# Patient Record
Sex: MALE | Race: WHITE | Employment: OTHER | URBAN - NONMETROPOLITAN AREA
[De-identification: names, ages, dates, MRNs, and addresses within clinical notes are randomized per-mention and may not be internally consistent; named-entity substitution may affect disease eponyms.]

---

## 2023-08-16 ENCOUNTER — HOSPITAL ENCOUNTER (OUTPATIENT)
Dept: GENERAL RADIOLOGY | Age: 73
Discharge: HOME | End: 2023-08-16
Payer: OTHER GOVERNMENT

## 2023-08-16 ENCOUNTER — OFFICE VISIT (OUTPATIENT)
Dept: NEUROLOGY | Age: 73
End: 2023-08-16
Payer: OTHER GOVERNMENT

## 2023-08-16 VITALS
WEIGHT: 270 LBS | BODY MASS INDEX: 36.57 KG/M2 | DIASTOLIC BLOOD PRESSURE: 82 MMHG | OXYGEN SATURATION: 97 % | HEART RATE: 51 BPM | HEIGHT: 72 IN | SYSTOLIC BLOOD PRESSURE: 159 MMHG

## 2023-08-16 DIAGNOSIS — M25.562 ACUTE PAIN OF LEFT KNEE: Primary | ICD-10-CM

## 2023-08-16 DIAGNOSIS — R29.898 WEAKNESS OF LEFT FOOT: ICD-10-CM

## 2023-08-16 DIAGNOSIS — R20.0 NUMBNESS AND TINGLING OF BOTH LEGS: ICD-10-CM

## 2023-08-16 DIAGNOSIS — M25.562 ACUTE PAIN OF LEFT KNEE: ICD-10-CM

## 2023-08-16 DIAGNOSIS — R20.2 NUMBNESS AND TINGLING OF BOTH LEGS: Primary | ICD-10-CM

## 2023-08-16 DIAGNOSIS — R20.0 NUMBNESS AND TINGLING OF BOTH LEGS: Primary | ICD-10-CM

## 2023-08-16 DIAGNOSIS — M54.50 CHRONIC BILATERAL LOW BACK PAIN WITHOUT SCIATICA: ICD-10-CM

## 2023-08-16 DIAGNOSIS — R20.2 NUMBNESS AND TINGLING OF BOTH LEGS: ICD-10-CM

## 2023-08-16 DIAGNOSIS — G89.29 CHRONIC BILATERAL LOW BACK PAIN WITHOUT SCIATICA: ICD-10-CM

## 2023-08-16 LAB
ALBUMIN SERPL-MCNC: 4.8 G/DL (ref 3.5–5.2)
ALP SERPL-CCNC: 90 U/L (ref 40–130)
ALT SERPL-CCNC: 31 U/L (ref 5–41)
ANION GAP SERPL CALCULATED.3IONS-SCNC: 12 MMOL/L (ref 7–19)
AST SERPL-CCNC: 24 U/L (ref 5–40)
BASOPHILS # BLD: 0.1 K/UL (ref 0–0.2)
BASOPHILS NFR BLD: 0.8 % (ref 0–1)
BILIRUB SERPL-MCNC: 0.8 MG/DL (ref 0.2–1.2)
BUN SERPL-MCNC: 9 MG/DL (ref 8–23)
CALCIUM SERPL-MCNC: 10.2 MG/DL (ref 8.8–10.2)
CHLORIDE SERPL-SCNC: 101 MMOL/L (ref 98–111)
CO2 SERPL-SCNC: 29 MMOL/L (ref 22–29)
CREAT SERPL-MCNC: 0.7 MG/DL (ref 0.5–1.2)
CRP SERPL HS-MCNC: <0.3 MG/DL (ref 0–0.5)
EOSINOPHIL # BLD: 0.2 K/UL (ref 0–0.6)
EOSINOPHIL NFR BLD: 2.9 % (ref 0–5)
ERYTHROCYTE [DISTWIDTH] IN BLOOD BY AUTOMATED COUNT: 13.9 % (ref 11.5–14.5)
ERYTHROCYTE [SEDIMENTATION RATE] IN BLOOD BY WESTERGREN METHOD: 2 MM/HR (ref 0–15)
GLUCOSE SERPL-MCNC: 122 MG/DL (ref 74–109)
HBA1C MFR BLD: 7.4 % (ref 4–6)
HCT VFR BLD AUTO: 47.2 % (ref 42–52)
HGB BLD-MCNC: 15.4 G/DL (ref 14–18)
HIV-1 P24 AG: NORMAL
HIV1+2 AB SERPLBLD QL IA.RAPID: NORMAL
IMM GRANULOCYTES # BLD: 0 K/UL
LYMPHOCYTES # BLD: 1.5 K/UL (ref 1.1–4.5)
LYMPHOCYTES NFR BLD: 23.1 % (ref 20–40)
MCH RBC QN AUTO: 29.5 PG (ref 27–31)
MCHC RBC AUTO-ENTMCNC: 32.6 G/DL (ref 33–37)
MCV RBC AUTO: 90.4 FL (ref 80–94)
MONOCYTES # BLD: 0.7 K/UL (ref 0–0.9)
MONOCYTES NFR BLD: 10.8 % (ref 0–10)
NEUTROPHILS # BLD: 3.9 K/UL (ref 1.5–7.5)
NEUTS SEG NFR BLD: 61.9 % (ref 50–65)
PLATELET # BLD AUTO: 167 K/UL (ref 130–400)
PMV BLD AUTO: 11.3 FL (ref 9.4–12.4)
POTASSIUM SERPL-SCNC: 4.3 MMOL/L (ref 3.5–5)
PROT SERPL-MCNC: 7.6 G/DL (ref 6.6–8.7)
RBC # BLD AUTO: 5.22 M/UL (ref 4.7–6.1)
RHEUMATOID FACT SER NEPH-ACNC: <10 IU/ML
SODIUM SERPL-SCNC: 142 MMOL/L (ref 136–145)
TSH SERPL DL<=0.005 MIU/L-ACNC: 0.99 UIU/ML (ref 0.35–5.5)
VIT B12 SERPL-MCNC: 994 PG/ML (ref 211–946)
WBC # BLD AUTO: 6.3 K/UL (ref 4.8–10.8)

## 2023-08-16 PROCEDURE — 99204 OFFICE O/P NEW MOD 45 MIN: CPT | Performed by: NURSE PRACTITIONER

## 2023-08-16 PROCEDURE — 1123F ACP DISCUSS/DSCN MKR DOCD: CPT | Performed by: NURSE PRACTITIONER

## 2023-08-16 PROCEDURE — 73560 X-RAY EXAM OF KNEE 1 OR 2: CPT

## 2023-08-16 RX ORDER — AMPICILLIN TRIHYDRATE 250 MG
CAPSULE ORAL
COMMUNITY

## 2023-08-16 RX ORDER — ALOGLIPTIN 25 MG/1
25 TABLET, FILM COATED ORAL DAILY
COMMUNITY

## 2023-08-16 RX ORDER — LISINOPRIL 40 MG/1
40 TABLET ORAL 2 TIMES DAILY
COMMUNITY

## 2023-08-16 RX ORDER — LANOLIN ALCOHOL/MO/W.PET/CERES
1000 CREAM (GRAM) TOPICAL DAILY
COMMUNITY

## 2023-08-16 RX ORDER — DULOXETIN HYDROCHLORIDE 20 MG/1
20 CAPSULE, DELAYED RELEASE ORAL DAILY
Qty: 30 CAPSULE | Refills: 3 | Status: SHIPPED | OUTPATIENT
Start: 2023-08-16

## 2023-08-16 RX ORDER — ASPIRIN 81 MG/1
81 TABLET ORAL DAILY
COMMUNITY

## 2023-08-16 RX ORDER — AMLODIPINE BESYLATE 2.5 MG/1
TABLET ORAL
COMMUNITY
Start: 2023-06-09

## 2023-08-16 RX ORDER — ATORVASTATIN CALCIUM 40 MG/1
40 TABLET, FILM COATED ORAL DAILY
COMMUNITY

## 2023-08-16 RX ORDER — PYRIDOXINE HCL (VITAMIN B6) 100 MG
50 TABLET ORAL DAILY
COMMUNITY

## 2023-08-16 RX ORDER — SILDENAFIL 100 MG/1
100 TABLET, FILM COATED ORAL PRN
COMMUNITY

## 2023-08-16 RX ORDER — HYDROCHLOROTHIAZIDE 50 MG/1
25 TABLET ORAL DAILY
COMMUNITY

## 2023-08-16 RX ORDER — PREGABALIN 150 MG/1
150 CAPSULE ORAL 2 TIMES DAILY
COMMUNITY

## 2023-08-16 NOTE — PROGRESS NOTES
[facility] Neurology Office Note      Patient:   Elisa Coffey  MR#:    [id number]  Account Number:                         YOB: 1950  Date of Evaluation:  8/16/2023  Time of Note:                          9:09 AM  Primary/Referring Physician:  PROVIDER UNKNOWN   Consulting Physician:  Willow Alas DNP, EYAL    NEW PATIENT CONSULTATION    Chief Complaint   Patient presents with    New Patient    Numbness     C/o numbness and tingling in all extremities. Was dx with neuropathy in early 2000's and is getting worse. States in June he has been dragging his left foot    Fall     Last fall was 1 week ago       [address] is a 68y.o. year old male here for evaluation of numbness/tingling, BLE weakness, falls. Diagnosed with neuropathy in 2008 at the [facility]. Has noted progression of symptoms over the last several years now. He notes BLE numbness and tingling. Has a cold sensation in both feet. He has noted heaviness in both legs, feels like he is dragging his left foot. He notes lower back pain with intermittent radiation of pain into the left leg. He did have several falls over the summer, injured his left knee in one fall. He is on Lyrica 150mg BID. Tried and failed Gabapentin. Has underlying vascular disease in BLE as well. He is a diabetic, last A1C was 7.6. History of vitamin B12 deficiency, on oral replacement. History of skin cancers but no chemotherapy. He was in the [employer] and exposed to agent orange. Past Medical History:   Diagnosis Date    Bilateral tinnitus     Chronic fatigue     Diabetes mellitus ([address])     Hypertension     Joint pain     Neuropathy     CODY (obstructive sleep apnea)        Past Surgical History:   Procedure Laterality Date    CYST REMOVAL         History reviewed. No pertinent family history.     Social History     Socioeconomic History    Marital status:      Spouse name: Not on file    Number of

## 2023-08-17 LAB — NUCLEAR IGG SER QL IA: NORMAL

## 2023-08-18 LAB
ALBUMIN SERPL-MCNC: 4.51 G/DL (ref 3.75–5.01)
ALPHA1 GLOB SERPL ELPH-MCNC: 0.32 G/DL (ref 0.19–0.46)
ALPHA2 GLOB SERPL ELPH-MCNC: 0.71 G/DL (ref 0.48–1.05)
B-GLOBULIN SERPL ELPH-MCNC: 1.05 G/DL (ref 0.48–1.1)
GAMMA GLOB SERPL ELPH-MCNC: 0.92 G/DL (ref 0.62–1.51)
INTERPRETATION SERPL IFE-IMP: NORMAL
PROT SERPL-MCNC: 7.5 G/DL (ref 6.3–8.2)
PROTEIN ELECTROPHORESIS, SERUM: NORMAL

## 2023-08-31 ENCOUNTER — HOSPITAL ENCOUNTER (OUTPATIENT)
Dept: NEUROLOGY | Age: 73
Discharge: HOME | End: 2023-08-31
Payer: OTHER GOVERNMENT

## 2023-08-31 DIAGNOSIS — R20.0 NUMBNESS AND TINGLING OF BOTH LEGS: ICD-10-CM

## 2023-08-31 DIAGNOSIS — M54.50 CHRONIC BILATERAL LOW BACK PAIN WITHOUT SCIATICA: ICD-10-CM

## 2023-08-31 DIAGNOSIS — R29.898 WEAKNESS OF LEFT FOOT: ICD-10-CM

## 2023-08-31 DIAGNOSIS — R20.2 NUMBNESS AND TINGLING OF BOTH LEGS: ICD-10-CM

## 2023-08-31 DIAGNOSIS — G89.29 CHRONIC BILATERAL LOW BACK PAIN WITHOUT SCIATICA: ICD-10-CM

## 2023-08-31 PROCEDURE — 95886 MUSC TEST DONE W/N TEST COMP: CPT | Performed by: PSYCHIATRY & NEUROLOGY

## 2023-08-31 PROCEDURE — 95909 NRV CNDJ TST 5-6 STUDIES: CPT

## 2023-08-31 PROCEDURE — 95886 MUSC TEST DONE W/N TEST COMP: CPT

## 2023-08-31 PROCEDURE — 95909 NRV CNDJ TST 5-6 STUDIES: CPT | Performed by: PSYCHIATRY & NEUROLOGY

## 2023-10-09 ENCOUNTER — HOSPITAL ENCOUNTER (OUTPATIENT)
Dept: MRI IMAGING | Age: 73
Discharge: HOME | End: 2023-10-09
Payer: OTHER GOVERNMENT

## 2023-10-09 ENCOUNTER — TELEPHONE (OUTPATIENT)
Dept: NEUROSURGERY | Age: 73
End: 2023-10-09

## 2023-10-09 ENCOUNTER — CLINICAL DOCUMENTATION (OUTPATIENT)
Dept: NEUROLOGY | Age: 73
End: 2023-10-09

## 2023-10-09 DIAGNOSIS — M54.50 CHRONIC BILATERAL LOW BACK PAIN WITHOUT SCIATICA: ICD-10-CM

## 2023-10-09 DIAGNOSIS — N28.89 LEFT RENAL MASS: Primary | ICD-10-CM

## 2023-10-09 DIAGNOSIS — G89.29 CHRONIC BILATERAL LOW BACK PAIN WITHOUT SCIATICA: ICD-10-CM

## 2023-10-09 DIAGNOSIS — R20.2 NUMBNESS AND TINGLING OF BOTH LEGS: ICD-10-CM

## 2023-10-09 DIAGNOSIS — R29.898 WEAKNESS OF LEFT FOOT: ICD-10-CM

## 2023-10-09 DIAGNOSIS — R20.0 NUMBNESS AND TINGLING OF BOTH LEGS: ICD-10-CM

## 2023-10-09 LAB
CREAT SERPL-MCNC: 0.7 MG/DL (ref 0.3–1.3)
PERFORMED ON: NORMAL
POC SAMPLE TYPE: NORMAL

## 2023-10-09 PROCEDURE — 72158 MRI LUMBAR SPINE W/O & W/DYE: CPT

## 2023-10-09 PROCEDURE — A9577 INJ MULTIHANCE: HCPCS | Performed by: NURSE PRACTITIONER

## 2023-10-09 PROCEDURE — 82565 ASSAY OF CREATININE: CPT

## 2023-10-09 PROCEDURE — 6360000004 HC RX CONTRAST MEDICATION: Performed by: NURSE PRACTITIONER

## 2023-10-09 RX ADMIN — GADOBENATE DIMEGLUMINE 20 ML: 529 INJECTION, SOLUTION INTRAVENOUS at 08:14

## 2023-10-09 NOTE — PROGRESS NOTES
MRI lumbar spine reviewed. There is DDD noted throughout with several levels of moderate to severe NF narrowing. There is also a 7.9cm mass in the left kidney which is suspicious for renal cell carcinoma. I called and discussed findings with the patient, answered questions. He verbalized understanding. Will place referral to oncology for additional work up/opinion. Will also place referral to neurosurgery for left foot weakness and MRI findings.

## 2023-10-09 NOTE — TELEPHONE ENCOUNTER
[facility] New Patient Questionnaire    Diagnosis/Reason for Referral?    Weakness of left foot  M54.50, G89.29 (ICD-10-CM) - Chronic bilateral low back pain without sciatica    2. Who is completing questionnaire? Patient  Caregiver Family      3. Has the patient had any previous spinal/brain surgeries? NO      A. If yes, what is the name of the facility in which the surgery was performed? B. Procedure/Surgery performed? C. Who was the surgeon? D. When was the surgery? MM/YY       E. Did the patient improve after the surgery? 4. Is this a second opinion? If yes, Dr. Joe Velasquez would like to review patient first before making the appointment. 5. Have MRI Images been obtain within the last year? Yes  No      XR  CT     If yes, where was the imaging performed? [facility]   If yes, what part of the body? Lumbar  Cervical  Thoracic  Brain     If yes, when was it obtained? 10/9/23    Note: if the scan was performed at a facility other than [facility], the disc will need to be brought to the appointment or we need to reach out to obtain the disc. A. Was the patient instructed to provide the disc? Yes   No      8. Has the patient had a NCV/EMG within the last year? Yes  No     If yes, where was it performed and date? 8/31/23 Location:[facility]      9. Has the patient been to Physical Therapy? Yes  No     If yes, what location, how long attended, and last visit? Location:        Therapy Lasted:    Date of Last Visit:      10. Has the patient been to Pain Management? Yes  No     If yes, what location and last visit     Location:   Last Visit:   Is it helping?

## 2023-10-10 ENCOUNTER — OFFICE VISIT (OUTPATIENT)
Dept: NEUROSURGERY | Age: 73
End: 2023-10-10
Payer: OTHER GOVERNMENT

## 2023-10-10 VITALS
DIASTOLIC BLOOD PRESSURE: 70 MMHG | RESPIRATION RATE: 18 BRPM | WEIGHT: 265 LBS | HEART RATE: 89 BPM | OXYGEN SATURATION: 94 % | HEIGHT: 70 IN | SYSTOLIC BLOOD PRESSURE: 151 MMHG | BODY MASS INDEX: 37.94 KG/M2

## 2023-10-10 DIAGNOSIS — M48.061 LUMBAR FORAMINAL STENOSIS: ICD-10-CM

## 2023-10-10 DIAGNOSIS — M51.16 LUMBAR DISC HERNIATION WITH RADICULOPATHY: Primary | ICD-10-CM

## 2023-10-10 DIAGNOSIS — M54.42 CHRONIC MIDLINE LOW BACK PAIN WITH LEFT-SIDED SCIATICA: ICD-10-CM

## 2023-10-10 DIAGNOSIS — R29.898 WEAKNESS OF LEFT FOOT: ICD-10-CM

## 2023-10-10 DIAGNOSIS — G89.29 CHRONIC MIDLINE LOW BACK PAIN WITH LEFT-SIDED SCIATICA: ICD-10-CM

## 2023-10-10 DIAGNOSIS — M51.36 DDD (DEGENERATIVE DISC DISEASE), LUMBAR: ICD-10-CM

## 2023-10-10 PROCEDURE — 99204 OFFICE O/P NEW MOD 45 MIN: CPT | Performed by: NEUROLOGICAL SURGERY

## 2023-10-10 PROCEDURE — 1123F ACP DISCUSS/DSCN MKR DOCD: CPT | Performed by: NEUROLOGICAL SURGERY

## 2023-10-10 ASSESSMENT — ENCOUNTER SYMPTOMS
BACK PAIN: 1
EYES NEGATIVE: 1
GASTROINTESTINAL NEGATIVE: 1
RESPIRATORY NEGATIVE: 1

## 2023-10-10 NOTE — PROGRESS NOTES
[facility] Neurosurgery  Office Visit      Chief Complaint   Patient presents with    New Patient     To establish care. Leg Pain     Patient states he has numbness and tingling in BLE with the left worse than the right side. He states he also has a mass on his kidneys that he was not sure is it was causing these symptoms. Patient states his pain is about a 6/10 today and that is in his bilateral feet, legs, and back. Fall     Patient had a fall around 6/3/2023 and that is when he think he noticed the weakness in his left foot. Back Pain     Patient states that he has had LBP for years and his symptoms are worsening suddenly. HISTORY OF PRESENT ILLNESS:    Bernardo Miramontes is a 68 y.o. male who presents with numbness of the BLE and foot weakness. He states he had a fall back in June of 2023 and since then he has been dragging his left leg. The pain does radiate into the LEFT lateral thigh. No pain travels past the knee. His pain is mostly located in the LLE. The patient complains of numbness of the bilateral feet. When ambulating, he has some LLE weakness, gait instability, and becomes dizzy. He has known neuropathy from being exposed to agent orange while in the [employer]. The patient has underwent a non-operative treatment course that has included:  Lyrica      Of note he does not use tobacco and does take blood thinning medications (ASA).                  Past Medical History:   Diagnosis Date    Bilateral tinnitus     Chronic fatigue     Diabetes mellitus (HCC)     Hypertension     Joint pain     Neuropathy     CODY (obstructive sleep apnea)        Past Surgical History:   Procedure Laterality Date    CYST REMOVAL         Current Outpatient Medications   Medication Sig Dispense Refill    amLODIPine (NORVASC) 2.5 MG tablet TAKE 1 TABLET (2.5 MG) BY ORAL ROUTE ONCE DAILY ADDED TO 5MG PILL = 7.5MG DAILY      lisinopril (PRINIVIL;ZESTRIL) 40 MG tablet Take 1 tablet by mouth in the

## 2023-10-10 NOTE — PROGRESS NOTES
Review of Systems   Constitutional: Negative. HENT: Negative. Eyes: Negative. Respiratory: Negative. Cardiovascular: Negative. Gastrointestinal: Negative. Genitourinary: Negative. Musculoskeletal:  Positive for back pain, falls and myalgias. Skin: Negative. Neurological:  Positive for tingling and weakness. Endo/Heme/Allergies: Negative. Psychiatric/Behavioral: Negative.

## 2023-10-11 NOTE — PROGRESS NOTES
MEDICAL ONCOLOGY CONSULTATION    Pt Name: Luis Mccann  MRN: [id number]  YOB: 1950  Date of evaluation: 10/16/2023    REASON FOR CONSULTATION:  Left kidney mass  REQUESTING PHYSICIAN: EYAL Mendez/[facility]    History Obtained From:  patient and old medical records    HISTORY OF PRESENT ILLNESS:    Diagnosis  Left  kidney mass, likely RCC Oct 2023  [id number]    Treatment Summary  To be determined    Cancer History  Isabel Aguilar was first seen by me on 10/16/2003. He was referred by neurology for an incidental finding of a large left kidney mass. Patient had complaints of back pain. MRI lumbar spine shows severe multilevel degenerative disease and also incidental finding of a large left kidney mass. Patient denies any hematuria. He was recently in the ER department with hypertension. Denies any abdominal pain. Denies any flank pain. 10/9/23 MRI lumbar spine ([facility]): Multilevel degenerative spondylosis. Findings are worse at L4/L5 where there is a central disc protrusion, severe left subarticular recess stenosis, and severe bilateral neural foraminal stenosis. Large 7.9cm mass in the superior left kidney suspicious for renal cell carcinoma. Unexpected finding. 10/16/2023-proceed with CT C/A/P for completion of staging and follow-up with PCP for a referral to urology. Past Medical History:    Past Medical History:   Diagnosis Date    Basal cell carcinoma     Bilateral tinnitus     Chronic fatigue     Diabetes mellitus ([address])     Hypertension     Joint pain     Neuropathy     CODY (obstructive sleep apnea)        Past Surgical History:    Past Surgical History:   Procedure Laterality Date    CYST REMOVAL      HERNIA REPAIR  1997       Social History:    Marital status:   Smoking status:Formerly; 1 1/2 pack daily; [id number]  ETOH status:Currently; 12 beers weekly  Resides: [place]    Family History:   History reviewed. No pertinent family history.     Current

## 2023-10-13 ENCOUNTER — TELEPHONE (OUTPATIENT)
Dept: HEMATOLOGY | Age: 73
End: 2023-10-13

## 2023-10-13 DIAGNOSIS — N28.89 LEFT RENAL MASS: Primary | ICD-10-CM

## 2023-10-13 NOTE — TELEPHONE ENCOUNTER
I called patient to remind them of their appointment on 10/16/2023. Patient voiced their understanding of the appointment and confirmed they would be coming. Patient was given date & time of appt.

## 2023-10-16 ENCOUNTER — HOSPITAL ENCOUNTER (OUTPATIENT)
Dept: INFUSION THERAPY | Age: 73
Discharge: HOME | End: 2023-10-16
Payer: MEDICARE

## 2023-10-16 ENCOUNTER — OFFICE VISIT (OUTPATIENT)
Dept: HEMATOLOGY | Age: 73
End: 2023-10-16
Payer: MEDICARE

## 2023-10-16 VITALS
TEMPERATURE: 98 F | HEART RATE: 74 BPM | WEIGHT: 272.4 LBS | BODY MASS INDEX: 36.9 KG/M2 | HEIGHT: 72 IN | SYSTOLIC BLOOD PRESSURE: 136 MMHG | DIASTOLIC BLOOD PRESSURE: 80 MMHG | OXYGEN SATURATION: 96 %

## 2023-10-16 DIAGNOSIS — N28.89 LEFT RENAL MASS: ICD-10-CM

## 2023-10-16 DIAGNOSIS — N28.89 LEFT KIDNEY MASS: Primary | ICD-10-CM

## 2023-10-16 DIAGNOSIS — Z71.89 CARE PLAN DISCUSSED WITH PATIENT: ICD-10-CM

## 2023-10-16 DIAGNOSIS — M47.816 OSTEOARTHRITIS OF LUMBAR SPINE, UNSPECIFIED SPINAL OSTEOARTHRITIS COMPLICATION STATUS: ICD-10-CM

## 2023-10-16 LAB
BASOPHILS # BLD: 0.05 K/UL (ref 0.01–0.08)
BASOPHILS NFR BLD: 0.5 % (ref 0.1–1.2)
EOSINOPHIL # BLD: 0.16 K/UL (ref 0.04–0.54)
EOSINOPHIL NFR BLD: 1.7 % (ref 0.7–7)
ERYTHROCYTE [DISTWIDTH] IN BLOOD BY AUTOMATED COUNT: 13.8 % (ref 11.6–14.4)
HCT VFR BLD AUTO: 41.3 % (ref 40.1–51)
HGB BLD-MCNC: 14.4 G/DL (ref 13.7–17.5)
LYMPHOCYTES # BLD: 1.35 K/UL (ref 1.18–3.74)
LYMPHOCYTES NFR BLD: 14.4 % (ref 19.3–53.1)
MCH RBC QN AUTO: 29.4 PG (ref 25.7–32.2)
MCHC RBC AUTO-ENTMCNC: 34.9 G/DL (ref 32.3–36.5)
MCV RBC AUTO: 84.5 FL (ref 79–92.2)
MONOCYTES # BLD: 0.72 K/UL (ref 0.24–0.82)
MONOCYTES NFR BLD: 7.7 % (ref 4.7–12.5)
NEUTROPHILS # BLD: 7.05 K/UL (ref 1.56–6.13)
NEUTS SEG NFR BLD: 75.3 % (ref 34–71.1)
PLATELET # BLD AUTO: 195 K/UL (ref 163–337)
PMV BLD AUTO: 11.1 FL (ref 7.4–10.4)
RBC # BLD AUTO: 4.89 M/UL (ref 4.63–6.08)
WBC # BLD AUTO: 9.37 K/UL (ref 4.23–9.07)

## 2023-10-16 PROCEDURE — 1123F ACP DISCUSS/DSCN MKR DOCD: CPT | Performed by: INTERNAL MEDICINE

## 2023-10-16 PROCEDURE — 36415 COLL VENOUS BLD VENIPUNCTURE: CPT

## 2023-10-16 PROCEDURE — 99204 OFFICE O/P NEW MOD 45 MIN: CPT | Performed by: INTERNAL MEDICINE

## 2023-10-16 PROCEDURE — 85025 COMPLETE CBC W/AUTO DIFF WBC: CPT

## 2023-10-16 PROCEDURE — 99212 OFFICE O/P EST SF 10 MIN: CPT

## 2023-10-16 ASSESSMENT — PROMIS GLOBAL HEALTH SCALE
IN THE PAST 7 DAYS, HOW WOULD YOU RATE YOUR PAIN ON AVERAGE [ON A SCALE FROM 0 (NO PAIN) TO 10 (WORST IMAGINABLE PAIN)]?: 6
TO WHAT EXTENT ARE YOU ABLE TO CARRY OUT YOUR EVERYDAY PHYSICAL ACTIVITIES SUCH AS WALKING, CLIMBING STAIRS, CARRYING GROCERIES, OR MOVING A CHAIR [ON A SCALE OF 1 (NOT AT ALL) TO 5 (COMPLETELY)]?: 3
IN GENERAL, HOW WOULD YOU RATE YOUR MENTAL HEALTH, INCLUDING YOUR MOOD AND YOUR ABILITY TO THINK [ON A SCALE OF 1 (POOR) TO 5 (EXCELLENT)]?: 3
IN THE PAST 7 DAYS, HOW OFTEN HAVE YOU BEEN BOTHERED BY EMOTIONAL PROBLEMS, SUCH AS FEELING ANXIOUS, DEPRESSED, OR IRRITABLE [ON A SCALE FROM 1 (NEVER) TO 5 (ALWAYS)]?: 3
SUM OF RESPONSES TO QUESTIONS 3, 6, 7, & 8: 14
SUM OF RESPONSES TO QUESTIONS 2, 4, 5, & 10: 11
IN GENERAL, HOW WOULD YOU RATE YOUR PHYSICAL HEALTH [ON A SCALE OF 1 (POOR) TO 5 (EXCELLENT)]?: 2
IN GENERAL, WOULD YOU SAY YOUR HEALTH IS...[ON A SCALE OF 1 (POOR) TO 5 (EXCELLENT)]: 2
IN THE PAST 7 DAYS, HOW WOULD YOU RATE YOUR FATIGUE ON AVERAGE [ON A SCALE FROM 1 (NONE) TO 5 (VERY SEVERE)]?: 3
IN GENERAL, PLEASE RATE HOW WELL YOU CARRY OUT YOUR USUAL SOCIAL ACTIVITIES (INCLUDES ACTIVITIES AT HOME, AT WORK, AND IN YOUR COMMUNITY, AND RESPONSIBILITIES AS A PARENT, CHILD, SPOUSE, EMPLOYEE, FRIEND, ETC) [ON A SCALE OF 1 (POOR) TO 5 (EXCELLENT)]?: 2
IN GENERAL, WOULD YOU SAY YOUR QUALITY OF LIFE IS...[ON A SCALE OF 1 (POOR) TO 5 (EXCELLENT)]: 3
IN GENERAL, HOW WOULD YOU RATE YOUR SATISFACTION WITH YOUR SOCIAL ACTIVITIES AND RELATIONSHIPS [ON A SCALE OF 1 (POOR) TO 5 (EXCELLENT)]?: 2

## 2023-10-30 ENCOUNTER — TELEPHONE (OUTPATIENT)
Dept: HEMATOLOGY | Age: 73
End: 2023-10-30

## 2023-10-30 NOTE — TELEPHONE ENCOUNTER
Call placed to patient this date for PROMIS screening follow-up. Patient did answer call. States he feels well supported at home right now. Denies need for assistance or support. Denies the need for community resources- denies transportation concerns or food insecurity. [facility]. Lives with spouse. Denies the need for further assist or support. Encouraged patient to reach out to office if his needs were to change- he is agreeable.      Joe Cruz MSW Intern for [address] [address] Medical Oncology and Hematology

## 2023-12-28 ENCOUNTER — TELEPHONE (OUTPATIENT)
Dept: NEUROLOGY | Age: 73
End: 2023-12-28

## 2023-12-28 NOTE — TELEPHONE ENCOUNTER
Shavon Plater wife Jorge Mixon called to schedule a  appt for back pain, and also dragging of left foot  . She states patient says left leg feels heavy. Please be advised that the best time to call him to accommodate their needs is  as soon as possible . Thank you.

## 2024-01-25 ENCOUNTER — OFFICE VISIT (OUTPATIENT)
Dept: NEUROSURGERY | Age: 74
End: 2024-01-25

## 2024-01-25 VITALS
RESPIRATION RATE: 18 BRPM | HEART RATE: 87 BPM | WEIGHT: 272 LBS | BODY MASS INDEX: 36.84 KG/M2 | SYSTOLIC BLOOD PRESSURE: 115 MMHG | HEIGHT: 72 IN | DIASTOLIC BLOOD PRESSURE: 65 MMHG

## 2024-01-25 DIAGNOSIS — M51.16 LUMBAR DISC HERNIATION WITH RADICULOPATHY: Primary | ICD-10-CM

## 2024-01-25 DIAGNOSIS — M54.42 CHRONIC MIDLINE LOW BACK PAIN WITH LEFT-SIDED SCIATICA: ICD-10-CM

## 2024-01-25 DIAGNOSIS — M51.36 DDD (DEGENERATIVE DISC DISEASE), LUMBAR: ICD-10-CM

## 2024-01-25 DIAGNOSIS — R20.0 NUMBNESS AND TINGLING OF BOTH LEGS: ICD-10-CM

## 2024-01-25 DIAGNOSIS — M48.061 LUMBAR FORAMINAL STENOSIS: ICD-10-CM

## 2024-01-25 DIAGNOSIS — G89.29 CHRONIC MIDLINE LOW BACK PAIN WITH LEFT-SIDED SCIATICA: ICD-10-CM

## 2024-01-25 DIAGNOSIS — R20.2 NUMBNESS AND TINGLING OF BOTH LEGS: ICD-10-CM

## 2024-01-25 DIAGNOSIS — R29.898 WEAKNESS OF LEFT FOOT: ICD-10-CM

## 2024-01-25 ASSESSMENT — ENCOUNTER SYMPTOMS
EYES NEGATIVE: 1
RESPIRATORY NEGATIVE: 1
GASTROINTESTINAL NEGATIVE: 1
BACK PAIN: 1

## 2024-01-25 NOTE — PROGRESS NOTES
[facility]  Office Visit      Chief Complaint   Patient presents with    Follow-up     Patient presents for follow up after on back pain after physical therapy.     Back Pain     Patient states he is still having back pain.  He is still going to physical therapy.  He states he is having numbness, tingling and weakness in his BUE.  He is having a lot og trouble with the weakness in his LLE.        HISTORY OF PRESENT ILLNESS:    Yohannes Dodge is a 73 y.o. male with a hx of DM who we initially evaluated on 10/10/2023 for numbness of the BLE and foot weakness.  He had a fall back in June of 2023 and since then he had been dragging his left leg.  The pain radiated into the LEFT lateral thigh. His pain was mostly located in the LLE.  Complained of numbness of the bilateral feet.  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      MRI lumbar spine had revealed a disc herniation that was most likely resulting in his pain.  A large mass on the kidney was found on the MRI in which he was referred to Dr. Riggins.  He was evaluated by him on 10/16/2023 who suspected RCC, however, was to have an evaluation [facility].  [facility] [place] [facility].  He is not on any chemo or radiation.  Stating \"they got it all.\" He has recovered well.  Had home health therapy for surgery recovery and for the left shoulder and has improved.      Today he returns to clinic to discuss his back and LLE pain.  Continues to have pain into the LEFT.  Has associate numbness. He is limping and dragging the LLE that is continuing to worsen.  He states they have also found two DVT in the LLE and is now on apixaban.      The patient has underwent a non-operative treatment course that has included:  Lyrica  Physical Therapy ([facility])      Of note he does not use tobacco and

## 2024-01-25 NOTE — PROGRESS NOTES
Review of Systems   Constitutional: Negative.    HENT: Negative.     Eyes: Negative.    Respiratory: Negative.     Cardiovascular: Negative.    Gastrointestinal: Negative.    Genitourinary: Negative.    Musculoskeletal:  Positive for back pain, joint pain and myalgias.   Skin: Negative.    Neurological:  Positive for tingling and weakness.   Endo/Heme/Allergies: Negative.    Psychiatric/Behavioral: Negative.

## 2024-02-13 ENCOUNTER — TELEPHONE (OUTPATIENT)
Dept: HEMATOLOGY | Age: 74
End: 2024-02-13

## 2024-02-13 NOTE — TELEPHONE ENCOUNTER
Patient called and stated his Dr in [place] removed kidney and no longer has cancer and will not need tx.

## 2024-03-12 ENCOUNTER — OFFICE VISIT (OUTPATIENT)
Dept: NEUROSURGERY | Age: 74
End: 2024-03-12
Payer: OTHER GOVERNMENT

## 2024-03-12 VITALS
SYSTOLIC BLOOD PRESSURE: 129 MMHG | DIASTOLIC BLOOD PRESSURE: 66 MMHG | HEART RATE: 74 BPM | HEIGHT: 72 IN | WEIGHT: 272 LBS | BODY MASS INDEX: 36.84 KG/M2

## 2024-03-12 DIAGNOSIS — M54.42 CHRONIC MIDLINE LOW BACK PAIN WITH LEFT-SIDED SCIATICA: ICD-10-CM

## 2024-03-12 DIAGNOSIS — R20.0 NUMBNESS AND TINGLING OF BOTH LEGS: ICD-10-CM

## 2024-03-12 DIAGNOSIS — R20.2 NUMBNESS AND TINGLING OF BOTH LEGS: ICD-10-CM

## 2024-03-12 DIAGNOSIS — G89.29 CHRONIC MIDLINE LOW BACK PAIN WITH LEFT-SIDED SCIATICA: ICD-10-CM

## 2024-03-12 DIAGNOSIS — M51.36 DDD (DEGENERATIVE DISC DISEASE), LUMBAR: ICD-10-CM

## 2024-03-12 DIAGNOSIS — M51.16 LUMBAR DISC HERNIATION WITH RADICULOPATHY: Primary | ICD-10-CM

## 2024-03-12 DIAGNOSIS — R29.898 WEAKNESS OF LEFT FOOT: ICD-10-CM

## 2024-03-12 DIAGNOSIS — M48.061 LUMBAR FORAMINAL STENOSIS: ICD-10-CM

## 2024-03-12 PROCEDURE — 1123F ACP DISCUSS/DSCN MKR DOCD: CPT | Performed by: NEUROLOGICAL SURGERY

## 2024-03-12 PROCEDURE — 99213 OFFICE O/P EST LOW 20 MIN: CPT | Performed by: NEUROLOGICAL SURGERY

## 2024-03-12 ASSESSMENT — ENCOUNTER SYMPTOMS
EYES NEGATIVE: 1
RESPIRATORY NEGATIVE: 1
GASTROINTESTINAL NEGATIVE: 1

## 2024-03-12 NOTE — PROGRESS NOTES
Review of Systems   Constitutional: Negative.    HENT: Negative.     Eyes: Negative.    Respiratory: Negative.     Cardiovascular: Negative.    Gastrointestinal: Negative.    Genitourinary: Negative.    Musculoskeletal:  Positive for joint pain and myalgias.   Skin: Negative.    Neurological:  Positive for tingling and weakness.

## 2024-03-12 NOTE — PROGRESS NOTES
[facility]  Office Visit      Chief Complaint   Patient presents with    Follow-up     Patient presents for follow up after PT.  Patient states it was going well and he was on a cane then he started declining and had to get back on his walker.  He states \"he has not made much progress with PT.\"     Numbness     Patient states he is have numbness weakness and tingling in his LLE.         HISTORY OF PRESENT ILLNESS:    Yohannes Dodge is a 74 y.o. male with a hx of DM who we initially evaluated on 10/10/2023 for numbness of the BLE and foot weakness.  He had a fall back in June of 2023 and since then he had been dragging his left leg.  The pain radiated into the LEFT lateral thigh. His pain was mostly located in the LLE.  Complained of numbness of the bilateral feet.  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      MRI lumbar spine had revealed a disc herniation that was most likely resulting in his pain.  A large mass on the kidney was found on the MRI in which he was referred to Dr. Riggins.  He was evaluated by him on 10/16/2023 who suspected RCC, however, was to have an evaluation with [facility].  He states [facility] [place] [facility].  He is not on any chemo or radiation.  Stating \"they got it all.\" He has recovered well.  Had home health therapy for surgery recovery and for the left shoulder and has improved.      He continues to have back and left lower extremity pain and underwent some physical therapy.  He is here today for follow-up.  He states that he was initially doing very well with PT and was able to graduate down to the use of his cane, and states he woke up one morning and the numbness and swelling of the LLE was worse and is now back to using his walker.  He states he can often touch his left thigh and it will feel cold.  He does have

## 2024-05-17 ENCOUNTER — APPOINTMENT (OUTPATIENT)
Dept: GENERAL RADIOLOGY | Age: 74
DRG: 309 | End: 2024-05-17
Payer: OTHER GOVERNMENT

## 2024-05-17 ENCOUNTER — HOSPITAL ENCOUNTER (INPATIENT)
Age: 74
LOS: 4 days | Discharge: HOME | DRG: 309 | End: 2024-05-21
Attending: EMERGENCY MEDICINE | Admitting: INTERNAL MEDICINE
Payer: OTHER GOVERNMENT

## 2024-05-17 DIAGNOSIS — I48.21 PERMANENT ATRIAL FIBRILLATION (HCC): ICD-10-CM

## 2024-05-17 DIAGNOSIS — I48.92 ATRIAL FLUTTER, UNSPECIFIED TYPE (HCC): ICD-10-CM

## 2024-05-17 DIAGNOSIS — R00.0 TACHYCARDIA: Primary | ICD-10-CM

## 2024-05-17 DIAGNOSIS — R06.02 SHORTNESS OF BREATH: ICD-10-CM

## 2024-05-17 DIAGNOSIS — I48.19 PERSISTENT ATRIAL FIBRILLATION (HCC): ICD-10-CM

## 2024-05-17 DIAGNOSIS — E87.1 HYPONATREMIA: ICD-10-CM

## 2024-05-17 DIAGNOSIS — R07.9 CHEST PAIN, UNSPECIFIED TYPE: ICD-10-CM

## 2024-05-17 PROBLEM — I10 ESSENTIAL (PRIMARY) HYPERTENSION: Status: ACTIVE | Noted: 2024-05-17

## 2024-05-17 PROBLEM — E78.5 HYPERLIPIDEMIA: Status: ACTIVE | Noted: 2024-05-17

## 2024-05-17 PROBLEM — E11.40 NEUROPATHY DUE TO TYPE 2 DIABETES MELLITUS (HCC): Status: ACTIVE | Noted: 2024-05-17

## 2024-05-17 PROBLEM — Z86.718 HISTORY OF DEEP VENOUS THROMBOSIS: Status: ACTIVE | Noted: 2024-05-17

## 2024-05-17 PROBLEM — Z79.01 LONG TERM (CURRENT) USE OF ANTICOAGULANTS: Status: ACTIVE | Noted: 2024-05-17

## 2024-05-17 PROBLEM — E11.9 TYPE 2 DIABETES MELLITUS (HCC): Status: ACTIVE | Noted: 2024-05-17

## 2024-05-17 LAB
ALBUMIN SERPL-MCNC: 4 G/DL (ref 3.5–5.2)
ALP SERPL-CCNC: 93 U/L (ref 40–130)
ALT SERPL-CCNC: 22 U/L (ref 5–41)
ANION GAP SERPL CALCULATED.3IONS-SCNC: 12 MMOL/L (ref 7–19)
APTT PPP: 27.7 SEC (ref 26–36.2)
AST SERPL-CCNC: 16 U/L (ref 5–40)
BASOPHILS # BLD: 0.1 K/UL (ref 0–0.2)
BASOPHILS NFR BLD: 0.4 % (ref 0–1)
BILIRUB SERPL-MCNC: 0.7 MG/DL (ref 0.2–1.2)
BILIRUB UR QL STRIP: NEGATIVE
BNP BLD-MCNC: 2158 PG/ML (ref 0–124)
BUN SERPL-MCNC: 29 MG/DL (ref 8–23)
CALCIUM SERPL-MCNC: 9.6 MG/DL (ref 8.8–10.2)
CHLORIDE SERPL-SCNC: 88 MMOL/L (ref 98–111)
CLARITY UR: CLEAR
CO2 SERPL-SCNC: 27 MMOL/L (ref 22–29)
COLOR UR: YELLOW
CREAT SERPL-MCNC: 1.2 MG/DL (ref 0.5–1.2)
D DIMER PPP FEU-MCNC: 0.3 UG/ML FEU (ref 0–0.48)
EOSINOPHIL # BLD: 0.1 K/UL (ref 0–0.6)
EOSINOPHIL NFR BLD: 0.4 % (ref 0–5)
ERYTHROCYTE [DISTWIDTH] IN BLOOD BY AUTOMATED COUNT: 13.2 % (ref 11.5–14.5)
GLUCOSE SERPL-MCNC: 156 MG/DL (ref 74–109)
GLUCOSE UR STRIP.AUTO-MCNC: NEGATIVE MG/DL
HCT VFR BLD AUTO: 40.5 % (ref 42–52)
HGB BLD-MCNC: 13.9 G/DL (ref 14–18)
HGB UR STRIP.AUTO-MCNC: NEGATIVE MG/L
IMM GRANULOCYTES # BLD: 0.1 K/UL
INR PPP: 1.12 (ref 0.88–1.18)
KETONES UR STRIP.AUTO-MCNC: NEGATIVE MG/DL
LEUKOCYTE ESTERASE UR QL STRIP.AUTO: NEGATIVE
LYMPHOCYTES # BLD: 1.3 K/UL (ref 1.1–4.5)
LYMPHOCYTES NFR BLD: 10.3 % (ref 20–40)
MCH RBC QN AUTO: 29.2 PG (ref 27–31)
MCHC RBC AUTO-ENTMCNC: 34.3 G/DL (ref 33–37)
MCV RBC AUTO: 85.1 FL (ref 80–94)
MONOCYTES # BLD: 1.3 K/UL (ref 0–0.9)
MONOCYTES NFR BLD: 9.7 % (ref 0–10)
NEUTROPHILS # BLD: 10.3 K/UL (ref 1.5–7.5)
NEUTS SEG NFR BLD: 78.6 % (ref 50–65)
NITRITE UR QL STRIP.AUTO: NEGATIVE
OSMOLALITY SERPL CALC.SUM OF ELEC: 271 MOSM/KG (ref 275–300)
PH UR STRIP.AUTO: 6 [PH] (ref 5–8)
PLATELET # BLD AUTO: 241 K/UL (ref 130–400)
PMV BLD AUTO: 9.4 FL (ref 9.4–12.4)
POTASSIUM SERPL-SCNC: 4.1 MMOL/L (ref 3.5–5)
PROT SERPL-MCNC: 6.8 G/DL (ref 6.6–8.7)
PROT UR STRIP.AUTO-MCNC: NEGATIVE MG/DL
PROTHROMBIN TIME: 14.1 SEC (ref 12–14.6)
RBC # BLD AUTO: 4.76 M/UL (ref 4.7–6.1)
REASON FOR REJECTION: NORMAL
REJECTED TEST: NORMAL
SODIUM SERPL-SCNC: 127 MMOL/L (ref 136–145)
SP GR UR STRIP.AUTO: 1.01 (ref 1–1.03)
T4 FREE SERPL-MCNC: 1.65 NG/DL (ref 0.93–1.7)
TROPONIN, HIGH SENSITIVITY: 39 NG/L (ref 0–22)
TROPONIN, HIGH SENSITIVITY: 43 NG/L (ref 0–22)
TROPONIN, HIGH SENSITIVITY: 45 NG/L (ref 0–22)
TROPONIN, HIGH SENSITIVITY: 46 NG/L (ref 0–22)
TSH SERPL DL<=0.005 MIU/L-ACNC: 0.25 UIU/ML (ref 0.27–4.2)
URATE UR-MCNC: 20.3 MG/DL
UROBILINOGEN UR STRIP.AUTO-MCNC: 1 E.U./DL
WBC # BLD AUTO: 13.1 K/UL (ref 4.8–10.8)

## 2024-05-17 PROCEDURE — 6370000000 HC RX 637 (ALT 250 FOR IP): Performed by: EMERGENCY MEDICINE

## 2024-05-17 PROCEDURE — 36415 COLL VENOUS BLD VENIPUNCTURE: CPT

## 2024-05-17 PROCEDURE — 96365 THER/PROPH/DIAG IV INF INIT: CPT

## 2024-05-17 PROCEDURE — 6370000000 HC RX 637 (ALT 250 FOR IP)

## 2024-05-17 PROCEDURE — 83880 ASSAY OF NATRIURETIC PEPTIDE: CPT

## 2024-05-17 PROCEDURE — 85610 PROTHROMBIN TIME: CPT

## 2024-05-17 PROCEDURE — 6370000000 HC RX 637 (ALT 250 FOR IP): Performed by: NURSE PRACTITIONER

## 2024-05-17 PROCEDURE — 84439 ASSAY OF FREE THYROXINE: CPT

## 2024-05-17 PROCEDURE — 81003 URINALYSIS AUTO W/O SCOPE: CPT

## 2024-05-17 PROCEDURE — 2500000003 HC RX 250 WO HCPCS: Performed by: EMERGENCY MEDICINE

## 2024-05-17 PROCEDURE — 83930 ASSAY OF BLOOD OSMOLALITY: CPT

## 2024-05-17 PROCEDURE — 80053 COMPREHEN METABOLIC PANEL: CPT

## 2024-05-17 PROCEDURE — 96376 TX/PRO/DX INJ SAME DRUG ADON: CPT

## 2024-05-17 PROCEDURE — 6360000002 HC RX W HCPCS: Performed by: NURSE PRACTITIONER

## 2024-05-17 PROCEDURE — 71045 X-RAY EXAM CHEST 1 VIEW: CPT

## 2024-05-17 PROCEDURE — 2580000003 HC RX 258: Performed by: NURSE PRACTITIONER

## 2024-05-17 PROCEDURE — 85025 COMPLETE CBC W/AUTO DIFF WBC: CPT

## 2024-05-17 PROCEDURE — 99285 EMERGENCY DEPT VISIT HI MDM: CPT

## 2024-05-17 PROCEDURE — 84484 ASSAY OF TROPONIN QUANT: CPT

## 2024-05-17 PROCEDURE — 84443 ASSAY THYROID STIM HORMONE: CPT

## 2024-05-17 PROCEDURE — 84560 ASSAY OF URINE/URIC ACID: CPT

## 2024-05-17 PROCEDURE — 93005 ELECTROCARDIOGRAM TRACING: CPT | Performed by: EMERGENCY MEDICINE

## 2024-05-17 PROCEDURE — 85730 THROMBOPLASTIN TIME PARTIAL: CPT

## 2024-05-17 PROCEDURE — 2140000000 HC CCU INTERMEDIATE R&B

## 2024-05-17 PROCEDURE — 2580000003 HC RX 258: Performed by: EMERGENCY MEDICINE

## 2024-05-17 PROCEDURE — 85379 FIBRIN DEGRADATION QUANT: CPT

## 2024-05-17 RX ORDER — ACETAMINOPHEN 325 MG/1
650 TABLET ORAL EVERY 6 HOURS PRN
Status: DISCONTINUED | OUTPATIENT
Start: 2024-05-17 | End: 2024-05-21 | Stop reason: HOSPADM

## 2024-05-17 RX ORDER — DILTIAZEM HYDROCHLORIDE 5 MG/ML
0.25 INJECTION INTRAVENOUS ONCE
Status: COMPLETED | OUTPATIENT
Start: 2024-05-17 | End: 2024-05-17

## 2024-05-17 RX ORDER — POLYETHYLENE GLYCOL 3350 17 G/17G
17 POWDER, FOR SOLUTION ORAL DAILY PRN
Status: DISCONTINUED | OUTPATIENT
Start: 2024-05-17 | End: 2024-05-21 | Stop reason: HOSPADM

## 2024-05-17 RX ORDER — PREGABALIN 50 MG/1
150 CAPSULE ORAL 2 TIMES DAILY
Status: DISCONTINUED | OUTPATIENT
Start: 2024-05-17 | End: 2024-05-21 | Stop reason: HOSPADM

## 2024-05-17 RX ORDER — ACETAMINOPHEN 650 MG/1
650 SUPPOSITORY RECTAL EVERY 6 HOURS PRN
Status: DISCONTINUED | OUTPATIENT
Start: 2024-05-17 | End: 2024-05-21 | Stop reason: HOSPADM

## 2024-05-17 RX ORDER — INSULIN LISPRO 100 [IU]/ML
0-8 INJECTION, SOLUTION INTRAVENOUS; SUBCUTANEOUS
Status: DISCONTINUED | OUTPATIENT
Start: 2024-05-18 | End: 2024-05-21 | Stop reason: HOSPADM

## 2024-05-17 RX ORDER — MELATONIN 10 MG
10 CAPSULE ORAL NIGHTLY
Status: DISCONTINUED | OUTPATIENT
Start: 2024-05-17 | End: 2024-05-21 | Stop reason: HOSPADM

## 2024-05-17 RX ORDER — ATORVASTATIN CALCIUM 40 MG/1
40 TABLET, FILM COATED ORAL DAILY
Status: DISCONTINUED | OUTPATIENT
Start: 2024-05-17 | End: 2024-05-21 | Stop reason: HOSPADM

## 2024-05-17 RX ORDER — ASPIRIN 81 MG/1
81 TABLET ORAL DAILY
Status: DISCONTINUED | OUTPATIENT
Start: 2024-05-17 | End: 2024-05-21 | Stop reason: HOSPADM

## 2024-05-17 RX ORDER — SODIUM CHLORIDE 0.9 % (FLUSH) 0.9 %
5-40 SYRINGE (ML) INJECTION PRN
Status: DISCONTINUED | OUTPATIENT
Start: 2024-05-17 | End: 2024-05-21 | Stop reason: HOSPADM

## 2024-05-17 RX ORDER — AMLODIPINE BESYLATE 5 MG/1
7.5 TABLET ORAL DAILY
Status: DISCONTINUED | OUTPATIENT
Start: 2024-05-17 | End: 2024-05-18

## 2024-05-17 RX ORDER — DILTIAZEM HYDROCHLORIDE 5 MG/ML
10 INJECTION INTRAVENOUS ONCE
Status: COMPLETED | OUTPATIENT
Start: 2024-05-17 | End: 2024-05-17

## 2024-05-17 RX ORDER — CHOLECALCIFEROL (VITAMIN D3) 125 MCG
1000 CAPSULE ORAL DAILY
Status: DISCONTINUED | OUTPATIENT
Start: 2024-05-17 | End: 2024-05-21 | Stop reason: HOSPADM

## 2024-05-17 RX ORDER — SODIUM CHLORIDE 0.9 % (FLUSH) 0.9 %
5-40 SYRINGE (ML) INJECTION EVERY 12 HOURS SCHEDULED
Status: DISCONTINUED | OUTPATIENT
Start: 2024-05-17 | End: 2024-05-21 | Stop reason: HOSPADM

## 2024-05-17 RX ORDER — INSULIN LISPRO 100 [IU]/ML
0-4 INJECTION, SOLUTION INTRAVENOUS; SUBCUTANEOUS NIGHTLY
Status: DISCONTINUED | OUTPATIENT
Start: 2024-05-17 | End: 2024-05-21 | Stop reason: HOSPADM

## 2024-05-17 RX ORDER — ENOXAPARIN SODIUM 150 MG/ML
1 INJECTION SUBCUTANEOUS 2 TIMES DAILY
Status: DISCONTINUED | OUTPATIENT
Start: 2024-05-17 | End: 2024-05-17

## 2024-05-17 RX ORDER — SODIUM CHLORIDE 9 MG/ML
INJECTION, SOLUTION INTRAVENOUS CONTINUOUS
Status: DISCONTINUED | OUTPATIENT
Start: 2024-05-17 | End: 2024-05-20

## 2024-05-17 RX ORDER — NITROGLYCERIN 0.4 MG/1
0.4 TABLET SUBLINGUAL EVERY 5 MIN PRN
Status: DISCONTINUED | OUTPATIENT
Start: 2024-05-17 | End: 2024-05-21 | Stop reason: HOSPADM

## 2024-05-17 RX ORDER — ONDANSETRON 4 MG/1
4 TABLET, ORALLY DISINTEGRATING ORAL EVERY 8 HOURS PRN
Status: DISCONTINUED | OUTPATIENT
Start: 2024-05-17 | End: 2024-05-21 | Stop reason: HOSPADM

## 2024-05-17 RX ORDER — ONDANSETRON 2 MG/ML
4 INJECTION INTRAMUSCULAR; INTRAVENOUS EVERY 6 HOURS PRN
Status: DISCONTINUED | OUTPATIENT
Start: 2024-05-17 | End: 2024-05-21 | Stop reason: HOSPADM

## 2024-05-17 RX ORDER — ENOXAPARIN SODIUM 150 MG/ML
1 INJECTION SUBCUTANEOUS 2 TIMES DAILY
Status: DISCONTINUED | OUTPATIENT
Start: 2024-05-17 | End: 2024-05-21 | Stop reason: HOSPADM

## 2024-05-17 RX ORDER — SODIUM CHLORIDE 9 MG/ML
INJECTION, SOLUTION INTRAVENOUS PRN
Status: DISCONTINUED | OUTPATIENT
Start: 2024-05-17 | End: 2024-05-21 | Stop reason: HOSPADM

## 2024-05-17 RX ADMIN — DILTIAZEM HYDROCHLORIDE 19.5 MG: 5 INJECTION, SOLUTION INTRAVENOUS at 16:01

## 2024-05-17 RX ADMIN — ENOXAPARIN SODIUM 105 MG: 120 INJECTION SUBCUTANEOUS at 21:03

## 2024-05-17 RX ADMIN — Medication 10 MG: at 23:17

## 2024-05-17 RX ADMIN — PREGABALIN 150 MG: 50 CAPSULE ORAL at 20:59

## 2024-05-17 RX ADMIN — METOPROLOL TARTRATE 25 MG: 25 TABLET, FILM COATED ORAL at 16:36

## 2024-05-17 RX ADMIN — SODIUM CHLORIDE: 9 INJECTION, SOLUTION INTRAVENOUS at 21:29

## 2024-05-17 RX ADMIN — DILTIAZEM HYDROCHLORIDE 10 MG: 5 INJECTION, SOLUTION INTRAVENOUS at 15:04

## 2024-05-17 RX ADMIN — DILTIAZEM HYDROCHLORIDE 5 MG/HR: 5 INJECTION, SOLUTION INTRAVENOUS at 16:46

## 2024-05-17 RX ADMIN — CYANOCOBALAMIN TAB 500 MCG 1000 MCG: 500 TAB at 21:27

## 2024-05-17 RX ADMIN — ATORVASTATIN CALCIUM 40 MG: 40 TABLET, FILM COATED ORAL at 21:27

## 2024-05-17 ASSESSMENT — PAIN DESCRIPTION - LOCATION: LOCATION: ABDOMEN;CHEST

## 2024-05-17 ASSESSMENT — PAIN SCALES - GENERAL: PAINLEVEL_OUTOF10: 1

## 2024-05-17 ASSESSMENT — PAIN DESCRIPTION - FREQUENCY: FREQUENCY: INTERMITTENT

## 2024-05-17 ASSESSMENT — PAIN - FUNCTIONAL ASSESSMENT: PAIN_FUNCTIONAL_ASSESSMENT: PREVENTS OR INTERFERES SOME ACTIVE ACTIVITIES AND ADLS

## 2024-05-17 ASSESSMENT — PAIN DESCRIPTION - ORIENTATION: ORIENTATION: MID

## 2024-05-17 ASSESSMENT — PAIN DESCRIPTION - PAIN TYPE: TYPE: ACUTE PAIN

## 2024-05-17 ASSESSMENT — PAIN DESCRIPTION - DESCRIPTORS: DESCRIPTORS: SHARP

## 2024-05-17 NOTE — ED PROVIDER NOTES
[facility] EMERGENCY DEPT  eMERGENCY dEPARTMENT eNCOUnter      Pt Name: Yohannes Dodge  MRN: [id number]  Birthdate 1950  Date of evaluation: 5/17/2024  Provider: Florentino Broderick MD    CHIEF COMPLAINT       Chief Complaint   Patient presents with    Chest Pain     X2 days, worse today         HISTORY OF PRESENT ILLNESS   (Location/Symptom, Timing/Onset,Context/Setting, Quality, Duration, Modifying Factors, Severity)  Note limiting factors.   Yohannes Dodge is a 74 y.o. male who presents to the emergency department due to fatigue chest pain and dyspnea.  Patient said for the past couple of days he has felt fatigued.  Prior to today no other symptoms.  No nausea vomiting diarrhea.  No fevers.  No cough.  Did notice that when he tried to walk across the room yesterday he got very winded.  Has a history of DVT in the past.  Is on Eliquis.  No hemoptysis.  Today around noon developed pressure in his chest that radiated up to his neck.  Checked his vitals and noticed his heart rate was elevated and then called EMS. Was given aspirin and nitro en route by EMS.  Discomfort patient was having in his chest and neck earlier improved.  Denies palpitations but heart rate still in the 140s.  No history of arrhythmia.  Patient has history of prior kidney cancer and prior nephrectomy November 2023.  Also has history of meningioma and is scheduled for surgery in the near future at [place] for this.  No headache numbness or focal weakness.    HPI    NursingNotes were reviewed.    REVIEW OF SYSTEMS    (2-9 systems for level 4, 10 or more for level 5)     Review of Systems   Constitutional:  Positive for fatigue. Negative for fever.   Eyes:  Negative for pain.   Respiratory:  Positive for shortness of breath.    Cardiovascular:  Positive for chest pain. Negative for palpitations and leg swelling.   Gastrointestinal:  Negative for abdominal pain, diarrhea and vomiting.   Genitourinary:  Negative for dysuria.   Skin:

## 2024-05-17 NOTE — H&P
[facility]      Hospitalist - History & Physical      PCP: Rubina Chester MD    Date of Admission: 5/17/2024    Date of Service: 5/17/2024    Chief Complaint:  chest pain    History Of Present Illness:   The patient is a 74 y.o. male with a PMH of renal cell carcinoma status post nephrectomy, type 2 diabetes, HLD, HTN, DVT currently on Eliquis, large right-sided falcine meningioma who presented to [facility] ED on 5/17/2024 complaining of chest pain. He states that he noticed that he was short of breath, worsening over the last 2 days prior to admission.  He reports good appetite.  On the day of admission he began to have chest pain centrally located with shortness of breath.  Sharp and shooting radiated up to his neck. He was given NTG with moderate relieve of his symptoms. He denies a previous hx of CAD or irregular heart rate.  He reports recent cortisone injections to bilateral knees in the last 5 days.  Reports strict compliance to his medication.  He states he has had an unintentional weight loss of approximately 35 pounds since his kidney surgery.  He denies lower extremity edema or palpitations.     Further ED workup revealed EKG atrial flutter 140s.  , CL 88, BUN 29 creatinine 1.2.  proBNP 2158.  Initial troponin 40 5 repeat 39.  WBC 13.1, H&H 39.9 and 40.5 with a platelet count of 241.  TSH 0.25.  Free T41.65.  INR 1.12.  He was given 2 doses of Cardizem IV push.  Atrial flutter continues at 140s.  Chest x-ray negative. UA pending.  Cardiology was consulted who recommends metoprolol 25 mg twice daily and will see in consult. The patient will be admitted to hospital medicine with atrial flutter, chest pain, and hyponatremia with a cardiology consult.     Past Medical History:        Diagnosis Date    Basal cell carcinoma     Bilateral tinnitus     Chronic fatigue     Diabetes mellitus (HCC)     Hypertension     Joint pain     Neuropathy     CODY (obstructive sleep apnea)        Past Surgical

## 2024-05-18 ENCOUNTER — APPOINTMENT (OUTPATIENT)
Age: 74
DRG: 309 | End: 2024-05-18
Payer: OTHER GOVERNMENT

## 2024-05-18 LAB
ALBUMIN SERPL-MCNC: 3.4 G/DL (ref 3.5–5.2)
ALBUMIN SERPL-MCNC: 3.4 G/DL (ref 3.5–5.2)
ALP SERPL-CCNC: 78 U/L (ref 40–130)
ALT SERPL-CCNC: 20 U/L (ref 5–41)
ANION GAP SERPL CALCULATED.3IONS-SCNC: 10 MMOL/L (ref 7–19)
ANION GAP SERPL CALCULATED.3IONS-SCNC: 11 MMOL/L (ref 7–19)
ANION GAP SERPL CALCULATED.3IONS-SCNC: 11 MMOL/L (ref 7–19)
AST SERPL-CCNC: 14 U/L (ref 5–40)
BILIRUB SERPL-MCNC: 0.8 MG/DL (ref 0.2–1.2)
BUN SERPL-MCNC: 29 MG/DL (ref 8–23)
BUN SERPL-MCNC: 30 MG/DL (ref 8–23)
BUN SERPL-MCNC: 30 MG/DL (ref 8–23)
CALCIUM SERPL-MCNC: 8.6 MG/DL (ref 8.8–10.2)
CALCIUM SERPL-MCNC: 8.8 MG/DL (ref 8.8–10.2)
CALCIUM SERPL-MCNC: 8.9 MG/DL (ref 8.8–10.2)
CHLORIDE SERPL-SCNC: 94 MMOL/L (ref 98–111)
CHLORIDE SERPL-SCNC: 95 MMOL/L (ref 98–111)
CHLORIDE SERPL-SCNC: 97 MMOL/L (ref 98–111)
CHOLEST SERPL-MCNC: 81 MG/DL (ref 160–199)
CO2 SERPL-SCNC: 23 MMOL/L (ref 22–29)
CREAT SERPL-MCNC: 1.2 MG/DL (ref 0.5–1.2)
CREAT SERPL-MCNC: 1.2 MG/DL (ref 0.5–1.2)
CREAT SERPL-MCNC: 1.3 MG/DL (ref 0.5–1.2)
ECHO AO ASC DIAM: 2.8 CM
ECHO AO ASCENDING AORTA INDEX: 1.25 CM/M2
ECHO AO ROOT DIAM: 1.8 CM
ECHO AO ROOT INDEX: 0.8 CM/M2
ECHO AO SINUS VALSALVA DIAM: 2.9 CM
ECHO AO SINUS VALSALVA INDEX: 1.29 CM/M2
ECHO AO ST JNCT DIAM: 2.6 CM
ECHO AV AREA PEAK VELOCITY: 2.2 CM2
ECHO AV AREA VTI: 1.8 CM2
ECHO AV AREA/BSA PEAK VELOCITY: 1 CM2/M2
ECHO AV AREA/BSA VTI: 0.8 CM2/M2
ECHO AV MEAN GRADIENT: 5 MMHG
ECHO AV MEAN GRADIENT: 5 MMHG
ECHO AV MEAN VELOCITY: 1.1 M/S
ECHO AV PEAK GRADIENT: 9 MMHG
ECHO AV PEAK VELOCITY: 1.5 M/S
ECHO AV VELOCITY RATIO: 0.67
ECHO AV VTI: 30.8 CM
ECHO BSA: 2.29 M2
ECHO EST RA PRESSURE: 3 MMHG
ECHO IVC PROX: 1.6 CM
ECHO LA AREA 2C: 14.1 CM2
ECHO LA AREA 4C: 14.7 CM2
ECHO LA DIAMETER INDEX: 1.83 CM/M2
ECHO LA DIAMETER: 4.1 CM
ECHO LA MAJOR AXIS: 5.1 CM
ECHO LA MINOR AXIS: 4.6 CM
ECHO LA TO AORTIC ROOT RATIO: 2.28
ECHO LA VOL BP: 37 ML (ref 18–58)
ECHO LA VOL MOD A2C: 35 ML (ref 18–58)
ECHO LA VOL MOD A4C: 35 ML (ref 18–58)
ECHO LA VOL/BSA BIPLANE: 17 ML/M2 (ref 16–34)
ECHO LA VOLUME INDEX MOD A2C: 16 ML/M2 (ref 16–34)
ECHO LA VOLUME INDEX MOD A4C: 16 ML/M2 (ref 16–34)
ECHO LV E' LATERAL VELOCITY: 12 CM/S
ECHO LV E' SEPTAL VELOCITY: 8 CM/S
ECHO LV EDV A2C: 150 ML
ECHO LV EDV A4C: 169 ML
ECHO LV EDV INDEX A4C: 75 ML/M2
ECHO LV EDV NDEX A2C: 67 ML/M2
ECHO LV EJECTION FRACTION A4C: 50 %
ECHO LV ESV A4C: 84 ML
ECHO LV ESV INDEX A4C: 38 ML/M2
ECHO LV FRACTIONAL SHORTENING: 36 % (ref 28–44)
ECHO LV INTERNAL DIMENSION DIASTOLE INDEX: 1.88 CM/M2
ECHO LV INTERNAL DIMENSION DIASTOLIC: 4.2 CM (ref 4.2–5.9)
ECHO LV INTERNAL DIMENSION SYSTOLIC INDEX: 1.21 CM/M2
ECHO LV INTERNAL DIMENSION SYSTOLIC: 2.7 CM
ECHO LV IVSD: 1.9 CM (ref 0.6–1)
ECHO LV MASS 2D: 290 G (ref 88–224)
ECHO LV MASS INDEX 2D: 129.4 G/M2 (ref 49–115)
ECHO LV POSTERIOR WALL DIASTOLIC: 1.4 CM (ref 0.6–1)
ECHO LV RELATIVE WALL THICKNESS RATIO: 0.67
ECHO LVOT AREA: 3.1 CM2
ECHO LVOT AV VTI INDEX: 0.57
ECHO LVOT DIAM: 2 CM
ECHO LVOT MEAN GRADIENT: 2 MMHG
ECHO LVOT MEAN GRADIENT: 2 MMHG
ECHO LVOT PEAK GRADIENT: 4 MMHG
ECHO LVOT PEAK VELOCITY: 1 M/S
ECHO LVOT STROKE VOLUME INDEX: 24.7 ML/M2
ECHO LVOT SV: 55.3 ML
ECHO LVOT VTI: 17.6 CM
ECHO MV A VELOCITY: 0.47 M/S
ECHO MV AREA VTI: 1.9 CM2
ECHO MV E DECELERATION TIME (DT): 135 MS
ECHO MV E VELOCITY: 0.83 M/S
ECHO MV E/A RATIO: 1.77
ECHO MV E/E' LATERAL: 6.92
ECHO MV E/E' RATIO (AVERAGED): 8.65
ECHO MV E/E' SEPTAL: 10.38
ECHO MV LVOT VTI INDEX: 1.63
ECHO MV MAX VELOCITY: 1.1 M/S
ECHO MV MEAN GRADIENT: 2 MMHG
ECHO MV MEAN VELOCITY: 0.6 M/S
ECHO MV PEAK GRADIENT: 5 MMHG
ECHO MV REGURGITANT PEAK GRADIENT: 41 MMHG
ECHO MV REGURGITANT PEAK VELOCITY: 3.2 M/S
ECHO MV REGURGITANT VTIA: 78.9 CM
ECHO MV VTI: 28.7 CM
ECHO RA AREA 4C: 10.6 CM2
ECHO RA END SYSTOLIC VOLUME APICAL 4 CHAMBER INDEX BSA: 9 ML/M2
ECHO RA VOLUME: 20 ML
ECHO RIGHT VENTRICULAR SYSTOLIC PRESSURE (RVSP): 28 MMHG
ECHO RV BASAL DIMENSION: 4.1 CM
ECHO RV INTERNAL DIMENSION: 3.2 CM
ECHO RV LONGITUDINAL DIMENSION: 7.2 CM
ECHO RV MID DIMENSION: 2.2 CM
ECHO RV TAPSE: 1.5 CM (ref 1.7–?)
ECHO TV REGURGITANT MAX VELOCITY: 2.5 M/S
ECHO TV REGURGITANT PEAK GRADIENT: 25 MMHG
EKG P AXIS: NORMAL DEGREES
EKG P AXIS: NORMAL DEGREES
EKG P-R INTERVAL: NORMAL MS
EKG P-R INTERVAL: NORMAL MS
EKG Q-T INTERVAL: 270 MS
EKG Q-T INTERVAL: 332 MS
EKG QRS DURATION: 128 MS
EKG QRS DURATION: 66 MS
EKG QTC CALCULATION (BAZETT): 413 MS
EKG QTC CALCULATION (BAZETT): 470 MS
EKG T AXIS: -169 DEGREES
EKG T AXIS: 37 DEGREES
GLUCOSE BLD-MCNC: 158 MG/DL (ref 70–99)
GLUCOSE BLD-MCNC: 202 MG/DL (ref 70–99)
GLUCOSE BLD-MCNC: 225 MG/DL (ref 70–99)
GLUCOSE SERPL-MCNC: 163 MG/DL (ref 74–109)
GLUCOSE SERPL-MCNC: 166 MG/DL (ref 74–109)
GLUCOSE SERPL-MCNC: 200 MG/DL (ref 74–109)
HBA1C MFR BLD: 6.6 % (ref 4–6)
HDLC SERPL-MCNC: 34 MG/DL (ref 55–121)
INR PPP: 1.22 (ref 0.88–1.18)
LDLC SERPL CALC-MCNC: 34 MG/DL
PERFORMED ON: ABNORMAL
PHOSPHATE SERPL-MCNC: 3.7 MG/DL (ref 2.5–4.5)
POTASSIUM SERPL-SCNC: 4.3 MMOL/L (ref 3.5–5)
POTASSIUM SERPL-SCNC: 4.3 MMOL/L (ref 3.5–5)
POTASSIUM SERPL-SCNC: 4.6 MMOL/L (ref 3.5–5)
PROT SERPL-MCNC: 5.8 G/DL (ref 6.6–8.7)
PROTHROMBIN TIME: 15.1 SEC (ref 12–14.6)
SODIUM SERPL-SCNC: 128 MMOL/L (ref 136–145)
SODIUM SERPL-SCNC: 129 MMOL/L (ref 136–145)
SODIUM SERPL-SCNC: 130 MMOL/L (ref 136–145)
TRIGL SERPL-MCNC: 65 MG/DL (ref 0–149)
TSH SERPL DL<=0.005 MIU/L-ACNC: 0.24 UIU/ML (ref 0.27–4.2)

## 2024-05-18 PROCEDURE — 84484 ASSAY OF TROPONIN QUANT: CPT

## 2024-05-18 PROCEDURE — 93306 TTE W/DOPPLER COMPLETE: CPT

## 2024-05-18 PROCEDURE — 80061 LIPID PANEL: CPT

## 2024-05-18 PROCEDURE — 6370000000 HC RX 637 (ALT 250 FOR IP): Performed by: NURSE PRACTITIONER

## 2024-05-18 PROCEDURE — 2140000000 HC CCU INTERMEDIATE R&B

## 2024-05-18 PROCEDURE — 84443 ASSAY THYROID STIM HORMONE: CPT

## 2024-05-18 PROCEDURE — 82962 GLUCOSE BLOOD TEST: CPT

## 2024-05-18 PROCEDURE — 80053 COMPREHEN METABOLIC PANEL: CPT

## 2024-05-18 PROCEDURE — 83036 HEMOGLOBIN GLYCOSYLATED A1C: CPT

## 2024-05-18 PROCEDURE — 6370000000 HC RX 637 (ALT 250 FOR IP): Performed by: INTERNAL MEDICINE

## 2024-05-18 PROCEDURE — 85610 PROTHROMBIN TIME: CPT

## 2024-05-18 PROCEDURE — 36415 COLL VENOUS BLD VENIPUNCTURE: CPT

## 2024-05-18 PROCEDURE — 6370000000 HC RX 637 (ALT 250 FOR IP)

## 2024-05-18 PROCEDURE — 6360000002 HC RX W HCPCS: Performed by: NURSE PRACTITIONER

## 2024-05-18 RX ORDER — DILTIAZEM HYDROCHLORIDE 120 MG/1
120 CAPSULE, COATED, EXTENDED RELEASE ORAL DAILY
Status: DISCONTINUED | OUTPATIENT
Start: 2024-05-18 | End: 2024-05-21 | Stop reason: HOSPADM

## 2024-05-18 RX ADMIN — CYANOCOBALAMIN TAB 500 MCG 1000 MCG: 500 TAB at 08:21

## 2024-05-18 RX ADMIN — DILTIAZEM HYDROCHLORIDE 120 MG: 120 CAPSULE, COATED, EXTENDED RELEASE ORAL at 08:21

## 2024-05-18 RX ADMIN — METOPROLOL TARTRATE 25 MG: 25 TABLET, FILM COATED ORAL at 08:21

## 2024-05-18 RX ADMIN — ENOXAPARIN SODIUM 105 MG: 120 INJECTION SUBCUTANEOUS at 08:28

## 2024-05-18 RX ADMIN — INSULIN LISPRO 2 UNITS: 100 INJECTION, SOLUTION INTRAVENOUS; SUBCUTANEOUS at 17:04

## 2024-05-18 RX ADMIN — ENOXAPARIN SODIUM 105 MG: 120 INJECTION SUBCUTANEOUS at 20:50

## 2024-05-18 RX ADMIN — METOPROLOL TARTRATE 25 MG: 25 TABLET, FILM COATED ORAL at 20:50

## 2024-05-18 RX ADMIN — INSULIN LISPRO 2 UNITS: 100 INJECTION, SOLUTION INTRAVENOUS; SUBCUTANEOUS at 11:46

## 2024-05-18 RX ADMIN — PREGABALIN 150 MG: 50 CAPSULE ORAL at 20:50

## 2024-05-18 RX ADMIN — ASPIRIN 81 MG: 81 TABLET, COATED ORAL at 08:27

## 2024-05-18 RX ADMIN — PREGABALIN 150 MG: 50 CAPSULE ORAL at 08:22

## 2024-05-18 RX ADMIN — Medication 10 MG: at 20:50

## 2024-05-18 RX ADMIN — ATORVASTATIN CALCIUM 40 MG: 40 TABLET, FILM COATED ORAL at 08:21

## 2024-05-18 NOTE — CONSULTS
[facility]  Cardiology Consult      Requesting MD:  Cooper Holguin MD   Admit Status:         History obtained from:   [] Patient  [] Other (specify):     Patient:  Yohannes Dodge  [id number]     Chief Complaint:   Chief Complaint   Patient presents with    Chest Pain     X2 days, worse today         HPI: Mr. Dodge is a 74 y.o. male with a history of Meningioma surgery on this is contemplated in the next month or 2 I am told with chronic left-sided weakness for the last year or so now admitted 5/17/2024 complaints of intermittent chest pain over the past few days and shortness of breath he noticed his heart was racing only by checking his pulse rate or looking at his watch he was not otherwise aware of this.  No definite prior cardiac history.  Noted to be in atrial flutter no previous awareness of this as well.  Placed on various medications rate seems to be controlled at this time.  Hemoglobin A1c 6.6% TSH 0.25.  D-dimer 0.30  High-sensitivity troponins 45 and 39.  Cardiology consulted.      Review of Systems:  Review of Systems   Constitutional: Negative.  Negative for chills, fever and unexpected weight change.   HENT: Negative.     Eyes: Negative.    Respiratory: Negative.  Negative for shortness of breath.    Cardiovascular: Negative.  Negative for chest pain.   Gastrointestinal: Negative.  Negative for diarrhea, nausea and vomiting.   Endocrine: Negative.    Genitourinary: Negative.    Musculoskeletal: Negative.    Skin: Negative.    Neurological: Negative.    All other systems reviewed and are negative.      Cardiac Specific Data:  Specialty Problems          Cardiology Problems    * (Principal) Atrial flutter (HCC)        Chest pain        Essential (primary) hypertension        Hyperlipidemia           Past Medical History:  Past Medical History:   Diagnosis Date    Basal cell carcinoma     Bilateral tinnitus     Chronic fatigue     Diabetes mellitus (HCC)

## 2024-05-18 NOTE — ED NOTES
ED TO INPATIENT SBAR HANDOFF    Patient Name: Yohannes Dodge   : 1950  74 y.o.   Family/Caregiver Present: Yes  Code Status Order: No Order    C-SSRS: Risk of Suicide: No Risk  Sitter No  Restraints:         Situation  Chief Complaint:   Chief Complaint   Patient presents with    Chest Pain     X2 days, worse today     Patient Diagnosis: Atrial flutter (HCC) [I48.92]     Brief Description of Patient's Condition: Pt presented today w c/o CP that started 2 days ago but worsened today. Pt in A-flutter upon arrival, rate in 140's. Given bolus of 10mg Dilt w no change, given scond bolus of 19.5mg dilt (weight based) and 25mg PO metoprolol, no change. Started on dilt gtt, up to 10mg/hr w no change, up'd to 12.5mg/hr rate now controlled. A&o. 18g L wrist from EMS and 18g R AC. VSS.   Mental Status: oriented, alert, coherent, logical, thought processes intact, and able to concentrate and follow conversation  Arrived from: home    Imaging:   XR CHEST PORTABLE   Final Result       No acute radiographic abnormality.               ______________________________________    Electronically signed by: JUAN PABLO LUND M.D.   Date:     2024   Time:    14:40         COVID-19 Results:   Internal Administration   First Dose      Second Dose           Last COVID Lab POC Creatinine (mg/dL)   Date Value   10/09/2023 0.7     Sample Type (no units)   Date Value   10/09/2023 Unspecified     Rapid HIV 1&2 (no units)   Date Value   2023 Non-reactive           Abnormal labs:   Abnormal Labs Reviewed   CBC WITH AUTO DIFFERENTIAL - Abnormal; Notable for the following components:       Result Value    WBC 13.1 (*)     Hemoglobin 13.9 (*)     Hematocrit 40.5 (*)     Neutrophils % 78.6 (*)     Lymphocytes % 10.3 (*)     Neutrophils Absolute 10.3 (*)     Monocytes Absolute 1.30 (*)     All other components within normal limits   COMPREHENSIVE METABOLIC PANEL - Abnormal; Notable for the following components:    Sodium 127 (*)

## 2024-05-19 LAB
ALBUMIN SERPL-MCNC: 3.4 G/DL (ref 3.5–5.2)
ALBUMIN SERPL-MCNC: 3.4 G/DL (ref 3.5–5.2)
ANION GAP SERPL CALCULATED.3IONS-SCNC: 10 MMOL/L (ref 7–19)
ANION GAP SERPL CALCULATED.3IONS-SCNC: 9 MMOL/L (ref 7–19)
BASOPHILS # BLD: 0 K/UL (ref 0–0.2)
BASOPHILS NFR BLD: 0.5 % (ref 0–1)
BUN SERPL-MCNC: 25 MG/DL (ref 8–23)
BUN SERPL-MCNC: 25 MG/DL (ref 8–23)
CALCIUM SERPL-MCNC: 8.8 MG/DL (ref 8.8–10.2)
CALCIUM SERPL-MCNC: 9 MG/DL (ref 8.8–10.2)
CHLORIDE SERPL-SCNC: 100 MMOL/L (ref 98–111)
CHLORIDE SERPL-SCNC: 101 MMOL/L (ref 98–111)
CO2 SERPL-SCNC: 23 MMOL/L (ref 22–29)
CO2 SERPL-SCNC: 24 MMOL/L (ref 22–29)
CREAT SERPL-MCNC: 1 MG/DL (ref 0.5–1.2)
CREAT SERPL-MCNC: 1.2 MG/DL (ref 0.5–1.2)
EOSINOPHIL # BLD: 0.1 K/UL (ref 0–0.6)
EOSINOPHIL NFR BLD: 1 % (ref 0–5)
ERYTHROCYTE [DISTWIDTH] IN BLOOD BY AUTOMATED COUNT: 13.5 % (ref 11.5–14.5)
GLUCOSE BLD-MCNC: 194 MG/DL (ref 70–99)
GLUCOSE BLD-MCNC: 238 MG/DL (ref 70–99)
GLUCOSE BLD-MCNC: 256 MG/DL (ref 70–99)
GLUCOSE BLD-MCNC: 290 MG/DL (ref 70–99)
GLUCOSE SERPL-MCNC: 168 MG/DL (ref 74–109)
GLUCOSE SERPL-MCNC: 248 MG/DL (ref 74–109)
HCT VFR BLD AUTO: 35.2 % (ref 42–52)
HGB BLD-MCNC: 12 G/DL (ref 14–18)
IMM GRANULOCYTES # BLD: 0.1 K/UL
LYMPHOCYTES # BLD: 1 K/UL (ref 1.1–4.5)
LYMPHOCYTES NFR BLD: 14.2 % (ref 20–40)
MCH RBC QN AUTO: 29.3 PG (ref 27–31)
MCHC RBC AUTO-ENTMCNC: 34.1 G/DL (ref 33–37)
MCV RBC AUTO: 86.1 FL (ref 80–94)
MONOCYTES # BLD: 0.8 K/UL (ref 0–0.9)
MONOCYTES NFR BLD: 11.1 % (ref 0–10)
NEUTROPHILS # BLD: 5.3 K/UL (ref 1.5–7.5)
NEUTS SEG NFR BLD: 72.4 % (ref 50–65)
PERFORMED ON: ABNORMAL
PHOSPHATE SERPL-MCNC: 2.7 MG/DL (ref 2.5–4.5)
PHOSPHATE SERPL-MCNC: 3.2 MG/DL (ref 2.5–4.5)
PLATELET # BLD AUTO: 205 K/UL (ref 130–400)
PMV BLD AUTO: 9.8 FL (ref 9.4–12.4)
POTASSIUM SERPL-SCNC: 4.4 MMOL/L (ref 3.5–5)
POTASSIUM SERPL-SCNC: 4.5 MMOL/L (ref 3.5–5)
RBC # BLD AUTO: 4.09 M/UL (ref 4.7–6.1)
SODIUM SERPL-SCNC: 133 MMOL/L (ref 136–145)
SODIUM SERPL-SCNC: 134 MMOL/L (ref 136–145)
WBC # BLD AUTO: 7.3 K/UL (ref 4.8–10.8)

## 2024-05-19 PROCEDURE — 94760 N-INVAS EAR/PLS OXIMETRY 1: CPT

## 2024-05-19 PROCEDURE — 6370000000 HC RX 637 (ALT 250 FOR IP)

## 2024-05-19 PROCEDURE — 82962 GLUCOSE BLOOD TEST: CPT

## 2024-05-19 PROCEDURE — 85025 COMPLETE CBC W/AUTO DIFF WBC: CPT

## 2024-05-19 PROCEDURE — 6370000000 HC RX 637 (ALT 250 FOR IP): Performed by: INTERNAL MEDICINE

## 2024-05-19 PROCEDURE — 6370000000 HC RX 637 (ALT 250 FOR IP): Performed by: NURSE PRACTITIONER

## 2024-05-19 PROCEDURE — 36415 COLL VENOUS BLD VENIPUNCTURE: CPT

## 2024-05-19 PROCEDURE — 80069 RENAL FUNCTION PANEL: CPT

## 2024-05-19 PROCEDURE — 2140000000 HC CCU INTERMEDIATE R&B

## 2024-05-19 PROCEDURE — 6360000002 HC RX W HCPCS: Performed by: NURSE PRACTITIONER

## 2024-05-19 PROCEDURE — 2580000003 HC RX 258: Performed by: NURSE PRACTITIONER

## 2024-05-19 RX ORDER — AMIODARONE HYDROCHLORIDE 200 MG/1
200 TABLET ORAL 2 TIMES DAILY
Status: DISCONTINUED | OUTPATIENT
Start: 2024-05-19 | End: 2024-05-21 | Stop reason: HOSPADM

## 2024-05-19 RX ADMIN — AMIODARONE HYDROCHLORIDE 200 MG: 200 TABLET ORAL at 20:49

## 2024-05-19 RX ADMIN — ASPIRIN 81 MG: 81 TABLET, COATED ORAL at 08:05

## 2024-05-19 RX ADMIN — ENOXAPARIN SODIUM 105 MG: 120 INJECTION SUBCUTANEOUS at 08:03

## 2024-05-19 RX ADMIN — ATORVASTATIN CALCIUM 40 MG: 40 TABLET, FILM COATED ORAL at 08:05

## 2024-05-19 RX ADMIN — CYANOCOBALAMIN TAB 500 MCG 1000 MCG: 500 TAB at 08:07

## 2024-05-19 RX ADMIN — PREGABALIN 150 MG: 50 CAPSULE ORAL at 08:05

## 2024-05-19 RX ADMIN — INSULIN LISPRO 4 UNITS: 100 INJECTION, SOLUTION INTRAVENOUS; SUBCUTANEOUS at 11:20

## 2024-05-19 RX ADMIN — DILTIAZEM HYDROCHLORIDE 120 MG: 120 CAPSULE, COATED, EXTENDED RELEASE ORAL at 08:05

## 2024-05-19 RX ADMIN — PREGABALIN 150 MG: 50 CAPSULE ORAL at 20:49

## 2024-05-19 RX ADMIN — INSULIN LISPRO 2 UNITS: 100 INJECTION, SOLUTION INTRAVENOUS; SUBCUTANEOUS at 16:48

## 2024-05-19 RX ADMIN — Medication 10 MG: at 20:49

## 2024-05-19 RX ADMIN — ENOXAPARIN SODIUM 105 MG: 120 INJECTION SUBCUTANEOUS at 20:49

## 2024-05-19 RX ADMIN — METOPROLOL TARTRATE 25 MG: 25 TABLET, FILM COATED ORAL at 08:05

## 2024-05-19 RX ADMIN — METOPROLOL TARTRATE 25 MG: 25 TABLET, FILM COATED ORAL at 20:49

## 2024-05-19 RX ADMIN — AMIODARONE HYDROCHLORIDE 200 MG: 200 TABLET ORAL at 11:20

## 2024-05-19 RX ADMIN — SODIUM CHLORIDE, PRESERVATIVE FREE 10 ML: 5 INJECTION INTRAVENOUS at 20:49

## 2024-05-19 ASSESSMENT — PAIN SCALES - GENERAL
PAINLEVEL_OUTOF10: 0
PAINLEVEL_OUTOF10: 0

## 2024-05-20 LAB
ALBUMIN SERPL-MCNC: 3.5 G/DL (ref 3.5–5.2)
ANION GAP SERPL CALCULATED.3IONS-SCNC: 13 MMOL/L (ref 7–19)
BASOPHILS # BLD: 0 K/UL (ref 0–0.2)
BASOPHILS NFR BLD: 0.6 % (ref 0–1)
BUN SERPL-MCNC: 21 MG/DL (ref 8–23)
CALCIUM SERPL-MCNC: 9.2 MG/DL (ref 8.8–10.2)
CHLORIDE SERPL-SCNC: 102 MMOL/L (ref 98–111)
CO2 SERPL-SCNC: 21 MMOL/L (ref 22–29)
CREAT SERPL-MCNC: 1 MG/DL (ref 0.5–1.2)
EKG P AXIS: NORMAL DEGREES
EKG P-R INTERVAL: NORMAL MS
EKG Q-T INTERVAL: 348 MS
EKG QRS DURATION: 146 MS
EKG QTC CALCULATION (BAZETT): 439 MS
EKG T AXIS: 35 DEGREES
EOSINOPHIL # BLD: 0.1 K/UL (ref 0–0.6)
EOSINOPHIL NFR BLD: 1.4 % (ref 0–5)
ERYTHROCYTE [DISTWIDTH] IN BLOOD BY AUTOMATED COUNT: 13.5 % (ref 11.5–14.5)
GLUCOSE BLD-MCNC: 207 MG/DL (ref 70–99)
GLUCOSE BLD-MCNC: 225 MG/DL (ref 70–99)
GLUCOSE BLD-MCNC: 228 MG/DL (ref 70–99)
GLUCOSE BLD-MCNC: 277 MG/DL (ref 70–99)
GLUCOSE SERPL-MCNC: 231 MG/DL (ref 74–109)
HCT VFR BLD AUTO: 36.3 % (ref 42–52)
HGB BLD-MCNC: 12.1 G/DL (ref 14–18)
IMM GRANULOCYTES # BLD: 0.1 K/UL
LYMPHOCYTES # BLD: 1.2 K/UL (ref 1.1–4.5)
LYMPHOCYTES NFR BLD: 16.5 % (ref 20–40)
MCH RBC QN AUTO: 30 PG (ref 27–31)
MCHC RBC AUTO-ENTMCNC: 33.3 G/DL (ref 33–37)
MCV RBC AUTO: 89.9 FL (ref 80–94)
MONOCYTES # BLD: 0.9 K/UL (ref 0–0.9)
MONOCYTES NFR BLD: 12.3 % (ref 0–10)
NEUTROPHILS # BLD: 4.8 K/UL (ref 1.5–7.5)
NEUTS SEG NFR BLD: 68.2 % (ref 50–65)
PERFORMED ON: ABNORMAL
PHOSPHATE SERPL-MCNC: 2.7 MG/DL (ref 2.5–4.5)
PLATELET # BLD AUTO: 222 K/UL (ref 130–400)
PMV BLD AUTO: 10 FL (ref 9.4–12.4)
POTASSIUM SERPL-SCNC: 4.2 MMOL/L (ref 3.5–5)
RBC # BLD AUTO: 4.04 M/UL (ref 4.7–6.1)
SODIUM SERPL-SCNC: 136 MMOL/L (ref 136–145)
WBC # BLD AUTO: 7 K/UL (ref 4.8–10.8)

## 2024-05-20 PROCEDURE — 94760 N-INVAS EAR/PLS OXIMETRY 1: CPT

## 2024-05-20 PROCEDURE — 6370000000 HC RX 637 (ALT 250 FOR IP)

## 2024-05-20 PROCEDURE — 80069 RENAL FUNCTION PANEL: CPT

## 2024-05-20 PROCEDURE — 6370000000 HC RX 637 (ALT 250 FOR IP): Performed by: INTERNAL MEDICINE

## 2024-05-20 PROCEDURE — 2140000000 HC CCU INTERMEDIATE R&B

## 2024-05-20 PROCEDURE — 6360000002 HC RX W HCPCS: Performed by: NURSE PRACTITIONER

## 2024-05-20 PROCEDURE — 36415 COLL VENOUS BLD VENIPUNCTURE: CPT

## 2024-05-20 PROCEDURE — 85025 COMPLETE CBC W/AUTO DIFF WBC: CPT

## 2024-05-20 PROCEDURE — 6370000000 HC RX 637 (ALT 250 FOR IP): Performed by: NURSE PRACTITIONER

## 2024-05-20 PROCEDURE — 2580000003 HC RX 258: Performed by: NURSE PRACTITIONER

## 2024-05-20 PROCEDURE — 93005 ELECTROCARDIOGRAM TRACING: CPT | Performed by: INTERNAL MEDICINE

## 2024-05-20 PROCEDURE — 82962 GLUCOSE BLOOD TEST: CPT

## 2024-05-20 RX ORDER — INSULIN GLARGINE 100 [IU]/ML
15 INJECTION, SOLUTION SUBCUTANEOUS 2 TIMES DAILY
Status: DISCONTINUED | OUTPATIENT
Start: 2024-05-20 | End: 2024-05-21 | Stop reason: HOSPADM

## 2024-05-20 RX ORDER — INSULIN LISPRO 100 [IU]/ML
5 INJECTION, SOLUTION INTRAVENOUS; SUBCUTANEOUS
Status: DISCONTINUED | OUTPATIENT
Start: 2024-05-20 | End: 2024-05-21 | Stop reason: HOSPADM

## 2024-05-20 RX ORDER — DEXTROSE MONOHYDRATE 100 MG/ML
INJECTION, SOLUTION INTRAVENOUS CONTINUOUS PRN
Status: DISCONTINUED | OUTPATIENT
Start: 2024-05-20 | End: 2024-05-21 | Stop reason: HOSPADM

## 2024-05-20 RX ADMIN — PREGABALIN 150 MG: 50 CAPSULE ORAL at 08:11

## 2024-05-20 RX ADMIN — ASPIRIN 81 MG: 81 TABLET, COATED ORAL at 08:10

## 2024-05-20 RX ADMIN — ATORVASTATIN CALCIUM 40 MG: 40 TABLET, FILM COATED ORAL at 08:11

## 2024-05-20 RX ADMIN — AMIODARONE HYDROCHLORIDE 200 MG: 200 TABLET ORAL at 08:11

## 2024-05-20 RX ADMIN — ENOXAPARIN SODIUM 105 MG: 120 INJECTION SUBCUTANEOUS at 08:09

## 2024-05-20 RX ADMIN — INSULIN LISPRO 4 UNITS: 100 INJECTION, SOLUTION INTRAVENOUS; SUBCUTANEOUS at 11:58

## 2024-05-20 RX ADMIN — INSULIN LISPRO 2 UNITS: 100 INJECTION, SOLUTION INTRAVENOUS; SUBCUTANEOUS at 08:15

## 2024-05-20 RX ADMIN — METOPROLOL TARTRATE 25 MG: 25 TABLET, FILM COATED ORAL at 21:15

## 2024-05-20 RX ADMIN — SODIUM CHLORIDE, PRESERVATIVE FREE 10 ML: 5 INJECTION INTRAVENOUS at 08:11

## 2024-05-20 RX ADMIN — Medication 10 MG: at 21:15

## 2024-05-20 RX ADMIN — INSULIN LISPRO 5 UNITS: 100 INJECTION, SOLUTION INTRAVENOUS; SUBCUTANEOUS at 18:01

## 2024-05-20 RX ADMIN — ENOXAPARIN SODIUM 105 MG: 120 INJECTION SUBCUTANEOUS at 21:15

## 2024-05-20 RX ADMIN — INSULIN GLARGINE 15 UNITS: 100 INJECTION, SOLUTION SUBCUTANEOUS at 08:16

## 2024-05-20 RX ADMIN — INSULIN GLARGINE 15 UNITS: 100 INJECTION, SOLUTION SUBCUTANEOUS at 21:15

## 2024-05-20 RX ADMIN — SODIUM CHLORIDE, PRESERVATIVE FREE 10 ML: 5 INJECTION INTRAVENOUS at 21:19

## 2024-05-20 RX ADMIN — METOPROLOL TARTRATE 25 MG: 25 TABLET, FILM COATED ORAL at 08:11

## 2024-05-20 RX ADMIN — CYANOCOBALAMIN TAB 500 MCG 1000 MCG: 500 TAB at 08:11

## 2024-05-20 RX ADMIN — PREGABALIN 150 MG: 50 CAPSULE ORAL at 21:15

## 2024-05-20 RX ADMIN — INSULIN LISPRO 2 UNITS: 100 INJECTION, SOLUTION INTRAVENOUS; SUBCUTANEOUS at 17:23

## 2024-05-20 RX ADMIN — DILTIAZEM HYDROCHLORIDE 120 MG: 120 CAPSULE, COATED, EXTENDED RELEASE ORAL at 08:11

## 2024-05-20 RX ADMIN — AMIODARONE HYDROCHLORIDE 200 MG: 200 TABLET ORAL at 21:15

## 2024-05-20 ASSESSMENT — PAIN SCALES - GENERAL: PAINLEVEL_OUTOF10: 0

## 2024-05-21 ENCOUNTER — APPOINTMENT (OUTPATIENT)
Dept: NUCLEAR MEDICINE | Age: 74
DRG: 309 | End: 2024-05-21
Payer: OTHER GOVERNMENT

## 2024-05-21 ENCOUNTER — HOSPITAL ENCOUNTER (INPATIENT)
Age: 74
Discharge: HOME | DRG: 309 | End: 2024-05-23
Attending: INTERNAL MEDICINE
Payer: OTHER GOVERNMENT

## 2024-05-21 ENCOUNTER — ANESTHESIA EVENT (OUTPATIENT)
Age: 74
DRG: 309 | End: 2024-05-21
Payer: OTHER GOVERNMENT

## 2024-05-21 ENCOUNTER — ANESTHESIA (OUTPATIENT)
Age: 74
DRG: 309 | End: 2024-05-21
Payer: OTHER GOVERNMENT

## 2024-05-21 ENCOUNTER — APPOINTMENT (OUTPATIENT)
Age: 74
DRG: 309 | End: 2024-05-21
Payer: OTHER GOVERNMENT

## 2024-05-21 VITALS
SYSTOLIC BLOOD PRESSURE: 121 MMHG | HEIGHT: 70 IN | RESPIRATION RATE: 20 BRPM | WEIGHT: 231.38 LBS | OXYGEN SATURATION: 98 % | TEMPERATURE: 97.5 F | BODY MASS INDEX: 33.12 KG/M2 | HEART RATE: 51 BPM | DIASTOLIC BLOOD PRESSURE: 72 MMHG

## 2024-05-21 VITALS — RESPIRATION RATE: 14 BRPM | HEART RATE: 72 BPM | OXYGEN SATURATION: 96 %

## 2024-05-21 LAB
ALBUMIN SERPL-MCNC: 3.4 G/DL (ref 3.5–5.2)
ANION GAP SERPL CALCULATED.3IONS-SCNC: 12 MMOL/L (ref 7–19)
BASOPHILS # BLD: 0 K/UL (ref 0–0.2)
BASOPHILS NFR BLD: 0.5 % (ref 0–1)
BUN SERPL-MCNC: 19 MG/DL (ref 8–23)
CALCIUM SERPL-MCNC: 9.2 MG/DL (ref 8.8–10.2)
CHLORIDE SERPL-SCNC: 100 MMOL/L (ref 98–111)
CO2 SERPL-SCNC: 23 MMOL/L (ref 22–29)
CREAT SERPL-MCNC: 1.1 MG/DL (ref 0.5–1.2)
ECHO BSA: 2.29 M2
ECHO BSA: 2.29 M2
EKG P AXIS: NORMAL DEGREES
EKG P-R INTERVAL: NORMAL MS
EKG Q-T INTERVAL: 356 MS
EKG QRS DURATION: 138 MS
EKG QTC CALCULATION (BAZETT): 431 MS
EKG T AXIS: 13 DEGREES
EOSINOPHIL # BLD: 0.1 K/UL (ref 0–0.6)
EOSINOPHIL NFR BLD: 1.7 % (ref 0–5)
ERYTHROCYTE [DISTWIDTH] IN BLOOD BY AUTOMATED COUNT: 13.5 % (ref 11.5–14.5)
GLUCOSE BLD-MCNC: 195 MG/DL (ref 70–99)
GLUCOSE SERPL-MCNC: 97 MG/DL (ref 74–109)
HCT VFR BLD AUTO: 35.6 % (ref 42–52)
HGB BLD-MCNC: 12.1 G/DL (ref 14–18)
IMM GRANULOCYTES # BLD: 0.1 K/UL
LYMPHOCYTES # BLD: 1.4 K/UL (ref 1.1–4.5)
LYMPHOCYTES NFR BLD: 17.1 % (ref 20–40)
MCH RBC QN AUTO: 29.8 PG (ref 27–31)
MCHC RBC AUTO-ENTMCNC: 34 G/DL (ref 33–37)
MCV RBC AUTO: 87.7 FL (ref 80–94)
MONOCYTES # BLD: 0.9 K/UL (ref 0–0.9)
MONOCYTES NFR BLD: 10.4 % (ref 0–10)
NEUTROPHILS # BLD: 5.7 K/UL (ref 1.5–7.5)
NEUTS SEG NFR BLD: 69.6 % (ref 50–65)
NUC STRESS EJECTION FRACTION: 63 %
PERFORMED ON: ABNORMAL
PHOSPHATE SERPL-MCNC: 3.5 MG/DL (ref 2.5–4.5)
PLATELET # BLD AUTO: 255 K/UL (ref 130–400)
PMV BLD AUTO: 9.9 FL (ref 9.4–12.4)
POTASSIUM SERPL-SCNC: 4.1 MMOL/L (ref 3.5–5)
RBC # BLD AUTO: 4.06 M/UL (ref 4.7–6.1)
SODIUM SERPL-SCNC: 135 MMOL/L (ref 136–145)
STRESS BASELINE DIAS BP: 82 MMHG
STRESS BASELINE HR: 69 BPM
STRESS BASELINE SYS BP: 134 MMHG
STRESS EXERCISE DUR MIN: 5 MIN
STRESS EXERCISE DUR SEC: 0 SEC
STRESS PEAK DIAS BP: 83 MMHG
STRESS PEAK SYS BP: 141 MMHG
STRESS PERCENT HR ACHIEVED: 64 %
STRESS POST PEAK HR: 93 BPM
STRESS RATE PRESSURE PRODUCT: NORMAL BPM*MMHG
STRESS STAGE 1 BP: NORMAL MMHG
STRESS STAGE 1 DURATION: 1 MIN:SEC
STRESS STAGE 1 HR: 80 BPM
STRESS STAGE 2 BP: NORMAL MMHG
STRESS STAGE 2 DURATION: 1 MIN:SEC
STRESS STAGE 2 HR: 93 BPM
STRESS STAGE 3 BP: NORMAL MMHG
STRESS STAGE 3 DURATION: 1 MIN:SEC
STRESS STAGE 3 HR: 92 BPM
STRESS STAGE 4 BP: NORMAL MMHG
STRESS STAGE 4 DURATION: 1 MIN:SEC
STRESS STAGE 4 HR: 88 BPM
STRESS STAGE 5 BP: NORMAL MMHG
STRESS STAGE 5 DURATION: 1 MIN:SEC
STRESS STAGE 5 HR: 84 BPM
STRESS STAGE RECOVERY 1 BP: NORMAL MMHG
STRESS STAGE RECOVERY 1 DURATION: 1 MIN:SEC
STRESS STAGE RECOVERY 1 HR: 82 BPM
STRESS TARGET HR: 146 BPM
WBC # BLD AUTO: 8.3 K/UL (ref 4.8–10.8)

## 2024-05-21 PROCEDURE — 2580000003 HC RX 258: Performed by: NURSE PRACTITIONER

## 2024-05-21 PROCEDURE — 3700000001 HC ADD 15 MINUTES (ANESTHESIA)

## 2024-05-21 PROCEDURE — 2500000003 HC RX 250 WO HCPCS: Performed by: NURSE ANESTHETIST, CERTIFIED REGISTERED

## 2024-05-21 PROCEDURE — 6360000002 HC RX W HCPCS: Performed by: INTERNAL MEDICINE

## 2024-05-21 PROCEDURE — 82962 GLUCOSE BLOOD TEST: CPT

## 2024-05-21 PROCEDURE — 2580000003 HC RX 258: Performed by: NURSE ANESTHETIST, CERTIFIED REGISTERED

## 2024-05-21 PROCEDURE — 3700000000 HC ANESTHESIA ATTENDED CARE

## 2024-05-21 PROCEDURE — 36415 COLL VENOUS BLD VENIPUNCTURE: CPT

## 2024-05-21 PROCEDURE — 93242 EXT ECG>48HR<7D RECORDING: CPT

## 2024-05-21 PROCEDURE — B24BZZ4 ULTRASONOGRAPHY OF HEART WITH AORTA, TRANSESOPHAGEAL: ICD-10-PCS | Performed by: INTERNAL MEDICINE

## 2024-05-21 PROCEDURE — 6360000002 HC RX W HCPCS: Performed by: NURSE ANESTHETIST, CERTIFIED REGISTERED

## 2024-05-21 PROCEDURE — 94760 N-INVAS EAR/PLS OXIMETRY 1: CPT

## 2024-05-21 PROCEDURE — 85025 COMPLETE CBC W/AUTO DIFF WBC: CPT

## 2024-05-21 PROCEDURE — 6370000000 HC RX 637 (ALT 250 FOR IP): Performed by: NURSE PRACTITIONER

## 2024-05-21 PROCEDURE — 93017 CV STRESS TEST TRACING ONLY: CPT

## 2024-05-21 PROCEDURE — 3430000000 HC RX DIAGNOSTIC RADIOPHARMACEUTICAL: Performed by: INTERNAL MEDICINE

## 2024-05-21 PROCEDURE — 2580000003 HC RX 258: Performed by: INTERNAL MEDICINE

## 2024-05-21 PROCEDURE — 6370000000 HC RX 637 (ALT 250 FOR IP): Performed by: INTERNAL MEDICINE

## 2024-05-21 PROCEDURE — 94761 N-INVAS EAR/PLS OXIMETRY MLT: CPT

## 2024-05-21 PROCEDURE — A9502 TC99M TETROFOSMIN: HCPCS | Performed by: INTERNAL MEDICINE

## 2024-05-21 PROCEDURE — 93320 DOPPLER ECHO COMPLETE: CPT

## 2024-05-21 PROCEDURE — 80069 RENAL FUNCTION PANEL: CPT

## 2024-05-21 PROCEDURE — 5A2204Z RESTORATION OF CARDIAC RHYTHM, SINGLE: ICD-10-PCS | Performed by: INTERNAL MEDICINE

## 2024-05-21 PROCEDURE — 78452 HT MUSCLE IMAGE SPECT MULT: CPT

## 2024-05-21 RX ORDER — SODIUM CHLORIDE 9 MG/ML
INJECTION, SOLUTION INTRAVENOUS CONTINUOUS PRN
Status: DISCONTINUED | OUTPATIENT
Start: 2024-05-21 | End: 2024-05-21 | Stop reason: SDUPTHER

## 2024-05-21 RX ORDER — LIDOCAINE HYDROCHLORIDE 10 MG/ML
INJECTION, SOLUTION EPIDURAL; INFILTRATION; INTRACAUDAL; PERINEURAL PRN
Status: DISCONTINUED | OUTPATIENT
Start: 2024-05-21 | End: 2024-05-21 | Stop reason: SDUPTHER

## 2024-05-21 RX ORDER — HYDROMORPHONE HYDROCHLORIDE 1 MG/ML
0.5 INJECTION, SOLUTION INTRAMUSCULAR; INTRAVENOUS; SUBCUTANEOUS EVERY 5 MIN PRN
Status: CANCELLED | OUTPATIENT
Start: 2024-05-21

## 2024-05-21 RX ORDER — DILTIAZEM HYDROCHLORIDE 120 MG/1
120 CAPSULE, COATED, EXTENDED RELEASE ORAL DAILY
Qty: 30 CAPSULE | Refills: 3 | Status: SHIPPED | OUTPATIENT
Start: 2024-05-22

## 2024-05-21 RX ORDER — SODIUM CHLORIDE 0.9 % (FLUSH) 0.9 %
5-40 SYRINGE (ML) INJECTION EVERY 12 HOURS SCHEDULED
Status: CANCELLED | OUTPATIENT
Start: 2024-05-21

## 2024-05-21 RX ORDER — SODIUM CHLORIDE 9 MG/ML
INJECTION, SOLUTION INTRAVENOUS PRN
Status: DISCONTINUED | OUTPATIENT
Start: 2024-05-21 | End: 2024-05-21 | Stop reason: HOSPADM

## 2024-05-21 RX ORDER — SODIUM CHLORIDE 0.9 % (FLUSH) 0.9 %
5-40 SYRINGE (ML) INJECTION EVERY 12 HOURS SCHEDULED
Status: DISCONTINUED | OUTPATIENT
Start: 2024-05-21 | End: 2024-05-21 | Stop reason: HOSPADM

## 2024-05-21 RX ORDER — GLYCOPYRROLATE 0.2 MG/ML
INJECTION INTRAMUSCULAR; INTRAVENOUS PRN
Status: DISCONTINUED | OUTPATIENT
Start: 2024-05-21 | End: 2024-05-21 | Stop reason: SDUPTHER

## 2024-05-21 RX ORDER — SODIUM CHLORIDE 0.9 % (FLUSH) 0.9 %
5-40 SYRINGE (ML) INJECTION PRN
Status: CANCELLED | OUTPATIENT
Start: 2024-05-21

## 2024-05-21 RX ORDER — PROPOFOL 10 MG/ML
INJECTION, EMULSION INTRAVENOUS PRN
Status: DISCONTINUED | OUTPATIENT
Start: 2024-05-21 | End: 2024-05-21 | Stop reason: SDUPTHER

## 2024-05-21 RX ORDER — SODIUM CHLORIDE 0.9 % (FLUSH) 0.9 %
5-40 SYRINGE (ML) INJECTION PRN
Status: DISCONTINUED | OUTPATIENT
Start: 2024-05-21 | End: 2024-05-21 | Stop reason: HOSPADM

## 2024-05-21 RX ORDER — NALOXONE HYDROCHLORIDE 0.4 MG/ML
INJECTION, SOLUTION INTRAMUSCULAR; INTRAVENOUS; SUBCUTANEOUS PRN
Status: CANCELLED | OUTPATIENT
Start: 2024-05-21

## 2024-05-21 RX ORDER — SODIUM CHLORIDE 9 MG/ML
INJECTION, SOLUTION INTRAVENOUS PRN
Status: CANCELLED | OUTPATIENT
Start: 2024-05-21

## 2024-05-21 RX ORDER — METOCLOPRAMIDE HYDROCHLORIDE 5 MG/ML
10 INJECTION INTRAMUSCULAR; INTRAVENOUS
Status: CANCELLED | OUTPATIENT
Start: 2024-05-21 | End: 2024-05-22

## 2024-05-21 RX ORDER — DIPHENHYDRAMINE HYDROCHLORIDE 50 MG/ML
12.5 INJECTION INTRAMUSCULAR; INTRAVENOUS
Status: CANCELLED | OUTPATIENT
Start: 2024-05-21 | End: 2024-05-22

## 2024-05-21 RX ORDER — MEPERIDINE HYDROCHLORIDE 25 MG/ML
12.5 INJECTION INTRAMUSCULAR; INTRAVENOUS; SUBCUTANEOUS EVERY 5 MIN PRN
Status: CANCELLED | OUTPATIENT
Start: 2024-05-21

## 2024-05-21 RX ORDER — REGADENOSON 0.08 MG/ML
0.4 INJECTION, SOLUTION INTRAVENOUS
Status: COMPLETED | OUTPATIENT
Start: 2024-05-21 | End: 2024-05-21

## 2024-05-21 RX ORDER — AMIODARONE HYDROCHLORIDE 200 MG/1
200 TABLET ORAL 2 TIMES DAILY
Qty: 60 TABLET | Refills: 1 | Status: SHIPPED | OUTPATIENT
Start: 2024-05-21

## 2024-05-21 RX ORDER — DEXMEDETOMIDINE HYDROCHLORIDE 100 UG/ML
INJECTION, SOLUTION INTRAVENOUS PRN
Status: DISCONTINUED | OUTPATIENT
Start: 2024-05-21 | End: 2024-05-21 | Stop reason: SDUPTHER

## 2024-05-21 RX ORDER — HYDROMORPHONE HYDROCHLORIDE 1 MG/ML
0.25 INJECTION, SOLUTION INTRAMUSCULAR; INTRAVENOUS; SUBCUTANEOUS EVERY 5 MIN PRN
Status: CANCELLED | OUTPATIENT
Start: 2024-05-21

## 2024-05-21 RX ADMIN — DEXMEDETOMIDINE HYDROCHLORIDE 12 MCG: 100 INJECTION, SOLUTION, CONCENTRATE INTRAVENOUS at 11:31

## 2024-05-21 RX ADMIN — SODIUM CHLORIDE, PRESERVATIVE FREE 10 ML: 5 INJECTION INTRAVENOUS at 08:21

## 2024-05-21 RX ADMIN — ASPIRIN 81 MG: 81 TABLET, COATED ORAL at 08:24

## 2024-05-21 RX ADMIN — TETROFOSMIN 24 MILLICURIE: 0.23 INJECTION, POWDER, LYOPHILIZED, FOR SOLUTION INTRAVENOUS at 14:16

## 2024-05-21 RX ADMIN — METOPROLOL TARTRATE 25 MG: 25 TABLET, FILM COATED ORAL at 14:49

## 2024-05-21 RX ADMIN — CYANOCOBALAMIN TAB 500 MCG 1000 MCG: 500 TAB at 08:25

## 2024-05-21 RX ADMIN — AMIODARONE HYDROCHLORIDE 200 MG: 200 TABLET ORAL at 05:46

## 2024-05-21 RX ADMIN — GLYCOPYRROLATE 0.2 MG: 0.2 INJECTION, SOLUTION INTRAMUSCULAR; INTRAVENOUS at 11:33

## 2024-05-21 RX ADMIN — LIDOCAINE HYDROCHLORIDE 100 MG: 10 INJECTION, SOLUTION EPIDURAL; INFILTRATION; INTRACAUDAL; PERINEURAL at 11:34

## 2024-05-21 RX ADMIN — PROPOFOL 70 MG: 10 INJECTION, EMULSION INTRAVENOUS at 11:31

## 2024-05-21 RX ADMIN — PREGABALIN 150 MG: 50 CAPSULE ORAL at 08:25

## 2024-05-21 RX ADMIN — PROPOFOL 30 MG: 10 INJECTION, EMULSION INTRAVENOUS at 11:35

## 2024-05-21 RX ADMIN — ATORVASTATIN CALCIUM 40 MG: 40 TABLET, FILM COATED ORAL at 08:25

## 2024-05-21 RX ADMIN — PROPOFOL 50 MG: 10 INJECTION, EMULSION INTRAVENOUS at 11:39

## 2024-05-21 RX ADMIN — PROPOFOL 50 MG: 10 INJECTION, EMULSION INTRAVENOUS at 11:44

## 2024-05-21 RX ADMIN — SODIUM CHLORIDE, PRESERVATIVE FREE 10 ML: 5 INJECTION INTRAVENOUS at 08:00

## 2024-05-21 RX ADMIN — DILTIAZEM HYDROCHLORIDE 120 MG: 120 CAPSULE, COATED, EXTENDED RELEASE ORAL at 05:46

## 2024-05-21 RX ADMIN — REGADENOSON 0.4 MG: 0.08 INJECTION, SOLUTION INTRAVENOUS at 13:36

## 2024-05-21 RX ADMIN — TETROFOSMIN 8 MILLICURIE: 0.23 INJECTION, POWDER, LYOPHILIZED, FOR SOLUTION INTRAVENOUS at 14:16

## 2024-05-21 RX ADMIN — INSULIN GLARGINE 15 UNITS: 100 INJECTION, SOLUTION SUBCUTANEOUS at 08:17

## 2024-05-21 RX ADMIN — SODIUM CHLORIDE: 9 INJECTION, SOLUTION INTRAVENOUS at 11:31

## 2024-05-21 RX ADMIN — INSULIN LISPRO 5 UNITS: 100 INJECTION, SOLUTION INTRAVENOUS; SUBCUTANEOUS at 08:18

## 2024-05-21 ASSESSMENT — PAIN SCALES - GENERAL
PAINLEVEL_OUTOF10: 0
PAINLEVEL_OUTOF10: 0

## 2024-05-21 NOTE — DISCHARGE SUMMARY
daily.      Cinnamon 500 MG CAPS Take by mouth      pyridoxine (B-6) 100 MG tablet Take 0.5 tablets by mouth daily      vitamin B-12 (CYANOCOBALAMIN) 1000 MCG tablet Take 1 tablet by mouth daily      alogliptin (NESINA) 25 MG TABS tablet Take 1 tablet by mouth daily             Time Spent on discharge: 35min in the examination, evaluation, counseling and review of medications and discharge plan.      Signed:    Cooper Holguin MD   5/21/2024      Thank you Rubina Chester MD for the opportunity to be involved in this patient's care. If you have any questions or concerns, please feel free to contact me at [phone].

## 2024-05-21 NOTE — ANESTHESIA POSTPROCEDURE EVALUATION
Department of Anesthesiology  Postprocedure Note    Patient: Yohannes Dodge  MRN: [id number]  YOB: 1950  Date of evaluation: 5/21/2024    Procedure Summary       Date: 05/21/24 Room / Location: [facility]; [facility]    Anesthesia Start: 1131 Anesthesia Stop: 1157    Procedure: ROXANNA W/ POSSIBLE CARDIOVERSION (PRN CONTRAST/BUBBLE/3D) Diagnosis: Persistent atrial fibrillation (HCC)    Scheduled Providers:  Responsible Provider: Sumeet Lechuga APRN - CRNA    Anesthesia Type: Not recorded ASA Status: Not recorded            Anesthesia Type: No value filed.    Amber Phase I: Amber Score: 10    Amber Phase II:      Anesthesia Post Evaluation    Patient location during evaluation: bedside  Patient participation: complete - patient participated  Level of consciousness: awake  Pain score: 0  Airway patency: patent  Nausea & Vomiting: no nausea and no vomiting  Cardiovascular status: blood pressure returned to baseline  Respiratory status: acceptable  Hydration status: stable  Pain management: adequate    No notable events documented.

## 2024-05-21 NOTE — PROCEDURES
PROCEDURE NOTE  Date: 2024   Name: Yohannes Dodge  YOB: 1950    Procedures    Cardioversion Procedure Note    Patient name:  Yohannes Dodge   :  1950  [facility] Rec No:  [id number]   Room:  0708/708-02  Admission date:  2024  Physician:  Surinder Guardado MD    Date of procedure:  24    Indication: atrial fibrillation    Postoperative diagnosis: same    Complications: none    Anesthesia: Moderate Sedation    Procedure:  Direct current cardioversion.    Description:  After informed consent was obtained and after the patient was adequately sedated, the patient underwent synchronized DC cardioversion utilizing the biphasic defibrillator  with 360 joules. 1 shocks were delivered. The procedure was was successful in converting the patient to sinus rhythm. The patient tolerated the procedure well and awakened from sedation without difficulty.ECG pending.    An independent trained observer administered medications under my direction.  The patient was continuously monitored with respect to level of consciousness, and vital signs/physiologic status throughout the case.  For further details regarding specific medications and doses please refer to Cath Lab procedural notes.    Anesthesia start time:  Anesthesia stop time:      Impression:  Successful cardioversion from atrial fibrillation to sinus rhythm, as described above.    Surinder Guardado :01 PM

## 2024-05-21 NOTE — ANESTHESIA PRE PROCEDURE
Z79.01   • Essential (primary) hypertension I10   • Hyperlipidemia E78.5   • History of deep venous thrombosis Z86.718   • Atrial flutter (HCC) I48.92   • Chest pain R07.9   • Type 2 diabetes mellitus (HCC) E11.9   • Hyponatremia E87.1       Past Medical History:        Diagnosis Date   • Basal cell carcinoma    • Bilateral tinnitus    • Chronic fatigue    • Diabetes mellitus (HCC)    • Hypertension    • Joint pain    • Neuropathy    • CODY (obstructive sleep apnea)        Past Surgical History:        Procedure Laterality Date   • CYST REMOVAL     • HERNIA REPAIR         Social History:    Social History     Tobacco Use   • Smoking status: Former     Current packs/day: 0.00     Average packs/day: 1.5 packs/day for 32.0 years (48.0 ttl pk-yrs)     Types: Cigarettes     Start date:      Quit date:      Years since quittin.4   • Smokeless tobacco: Never   Substance Use Topics   • Alcohol use: Yes     Alcohol/week: 12.0 standard drinks of alcohol     Types: 12 Cans of beer per week                                Counseling given: Not Answered      Vital Signs (Current):   Vitals:    24 1059   Pulse: 72   Resp: 14   SpO2: 96%                                              BP Readings from Last 3 Encounters:   24 121/72   24 129/66   24 115/65       NPO Status:                                                                                 BMI:   Wt Readings from Last 3 Encounters:   24 105 kg (231 lb 6 oz)   24 123.4 kg (272 lb)   24 123.4 kg (272 lb)     There is no height or weight on file to calculate BMI.    CBC:   Lab Results   Component Value Date/Time    WBC 8.3 2024 01:58 AM    RBC 4.06 2024 01:58 AM    HGB 12.1 2024 01:58 AM    HCT 35.6 2024 01:58 AM    MCV 87.7 2024 01:58 AM    RDW 13.5 2024 01:58 AM     2024 01:58 AM       CMP:   Lab Results   Component Value Date/Time     2024 01:58 AM    K

## 2024-05-21 NOTE — PROGRESS NOTES
Hospitalist Progress Note        PCP: Rubina Chester MD    Date of Admission: 5/17/2024    Length of Stay: 1    Chief Complaint: chest pain, SOB and weakness and palpitations.       Hx of RCC s/p resection.   Hx of brain meningioma in process of surgical preparation through [facility].     Hs DVT - on eliquis. He will need IVC filter and then AC hold for 2 weeks prior to brain surgery - this is being arranged through [facility] - wife [facility] insurance approved the plan, but he is not scheduled yet.        Subjective:  no CP, no palpitations this am.       Medications:  Reviewed    Infusion Medications    sodium chloride      sodium chloride 125 mL/hr at 05/17/24 2129     Scheduled Medications    dilTIAZem  120 mg Oral Daily    metoprolol tartrate  25 mg Oral BID    aspirin  81 mg Oral Daily    pregabalin  150 mg Oral BID    vitamin B-12  1,000 mcg Oral Daily    atorvastatin  40 mg Oral Daily    sodium chloride flush  5-40 mL IntraVENous 2 times per day    insulin lispro  0-8 Units SubCUTAneous TID WC    insulin lispro  0-4 Units SubCUTAneous Nightly    enoxaparin  1 mg/kg SubCUTAneous BID    melatonin  10 mg Oral Nightly     PRN Meds: sodium chloride flush, sodium chloride, ondansetron **OR** ondansetron, polyethylene glycol, acetaminophen **OR** acetaminophen, perflutren lipid microspheres, nitroGLYCERIN      Intake/Output Summary (Last 24 hours) at 5/18/2024 1125  Last data filed at 5/18/2024 0900  Gross per 24 hour   Intake 425 ml   Output 300 ml   Net 125 ml       Physical Exam Performed:    /60   Pulse 59   Temp 97.7 °F (36.5 °C) (Temporal)   Resp 16   Ht 1.778 m (5' 10\")   Wt 106.6 kg (235 lb)   SpO2 98%   BMI 33.72 kg/m²     General appearance: No apparent distress, appears stated age and cooperative.  HEENT: Pupils equal, round, and reactive to light. Conjunctivae/corneas clear.  Neck: Supple, with full range of motion. No jugular venous distention. Trachea midline.  Respiratory:  
    Hospitalist Progress Note        PCP: Rubina Chester MD    Date of Admission: 5/17/2024    Length of Stay: 2    Chief Complaint: chest pain, SOB and weakness and palpitations.       Hx of RCC s/p resection.   Hx of brain meningioma in process of surgical preparation through [facility].     Hs DVT - on eliquis. He will need IVC filter and then AC hold for 2 weeks prior to brain surgery - this is being arranged through [facility] [place] - wife [facility] insurance approved the plan, but he is not scheduled yet.        Subjective:  no CP, no palpitations this am. Ambulated in [address] - felt fine.      Aflutter RVR persisting this am.       Medications:  Reviewed    Infusion Medications    sodium chloride      sodium chloride 125 mL/hr at 05/17/24 2129     Scheduled Medications    dilTIAZem  120 mg Oral Daily    metoprolol tartrate  25 mg Oral BID    aspirin  81 mg Oral Daily    pregabalin  150 mg Oral BID    vitamin B-12  1,000 mcg Oral Daily    atorvastatin  40 mg Oral Daily    sodium chloride flush  5-40 mL IntraVENous 2 times per day    insulin lispro  0-8 Units SubCUTAneous TID WC    insulin lispro  0-4 Units SubCUTAneous Nightly    enoxaparin  1 mg/kg SubCUTAneous BID    melatonin  10 mg Oral Nightly     PRN Meds: sodium chloride flush, sodium chloride, ondansetron **OR** ondansetron, polyethylene glycol, acetaminophen **OR** acetaminophen, perflutren lipid microspheres, nitroGLYCERIN      Intake/Output Summary (Last 24 hours) at 5/19/2024 1051  Last data filed at 5/19/2024 0528  Gross per 24 hour   Intake --   Output 5050 ml   Net -5050 ml         Physical Exam Performed:    /70   Pulse 64   Temp 98.1 °F (36.7 °C) (Temporal)   Resp 12   Ht 1.778 m (5' 10\")   Wt 106.7 kg (235 lb 2 oz)   SpO2 91%   BMI 33.74 kg/m²     General appearance: No apparent distress, appears stated age and cooperative.  HEENT: Pupils equal, round, and reactive to light. Conjunctivae/corneas clear.  Neck: Supple, with full 
    Hospitalist Progress Note        PCP: Rubina Chester MD    Date of Admission: 5/17/2024    Length of Stay: 3    Chief Complaint: chest pain, SOB and weakness and palpitations.       Hx of RCC s/p resection.   Hx of brain meningioma in process of surgical preparation through [facility].     Hs DVT - on eliquis. He will need IVC filter and then AC hold for 2 weeks prior to brain surgery - this is being arranged through [facility] - [facility] approved the plan, but he is not scheduled yet.        Subjective:       HR Aflutter variable degree block 110-120 this am.   HR Aflutter with 2x1 rate 75 overnight when sleeping.     Pt feels well this am.       Medications:  Reviewed    Infusion Medications    dextrose      sodium chloride       Scheduled Medications    insulin glargine  15 Units SubCUTAneous BID    amiodarone  200 mg Oral BID    dilTIAZem  120 mg Oral Daily    metoprolol tartrate  25 mg Oral BID    aspirin  81 mg Oral Daily    pregabalin  150 mg Oral BID    vitamin B-12  1,000 mcg Oral Daily    atorvastatin  40 mg Oral Daily    sodium chloride flush  5-40 mL IntraVENous 2 times per day    insulin lispro  0-8 Units SubCUTAneous TID WC    insulin lispro  0-4 Units SubCUTAneous Nightly    enoxaparin  1 mg/kg SubCUTAneous BID    melatonin  10 mg Oral Nightly     PRN Meds: glucose, dextrose bolus **OR** dextrose bolus, glucagon (rDNA), dextrose, sodium chloride flush, sodium chloride, ondansetron **OR** ondansetron, polyethylene glycol, acetaminophen **OR** acetaminophen, perflutren lipid microspheres, nitroGLYCERIN      Intake/Output Summary (Last 24 hours) at 5/20/2024 1052  Last data filed at 5/20/2024 1005  Gross per 24 hour   Intake 480 ml   Output --   Net 480 ml         Physical Exam Performed:    BP (!) 134/91   Pulse 67   Temp 98.2 °F (36.8 °C) (Temporal)   Resp 16   Ht 1.778 m (5' 10\")   Wt 105.2 kg (232 lb)   SpO2 97%   BMI 33.29 kg/m²     General appearance: No apparent 
4 Eyes Skin Assessment     NAME:  Yohannes Dodge  YOB: 1950  MEDICAL RECORD NUMBER:  [id number]    The patient is being assessed for  Admission    I agree that at least one RN has performed a thorough Head to Toe Skin Assessment on the patient. ALL assessment sites listed below have been assessed.      Areas assessed by both nurses:    Head, Face, Ears, Shoulders, Back, Chest, Arms, Elbows, Hands, Sacrum. Buttock, Coccyx, Ischium, and Legs. Feet and Heels        Does the Patient have a Wound? No noted wound(s)       Umair Prevention initiated by RN: No  Wound Care Orders initiated by RN: No    Pressure Injury (Stage 3,4, Unstageable, DTI, NWPT, and Complex wounds) if present, place Wound referral order by RN under : No    New Ostomies, if present place, Ostomy referral order under : No     Nurse 1 eSignature: Electronically signed by Mauro Gamble RN on 5/17/24 at 8:45 PM CDT    **SHARE this note so that the co-signing nurse can place an eSignature**    Nurse 2 eSignature: Electronically signed by Madina Yates RN on 5/17/24 at 9:19 PM CDT   
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) 05/17/2024    Long term (current) use of anticoagulants 05/17/2024    Essential (primary) hypertension 05/17/2024    Hyperlipidemia 05/17/2024    History of deep venous thrombosis 05/17/2024    Atrial flutter (HCC) 05/17/2024    Chest pain 05/17/2024    Type 2 diabetes mellitus (HCC) 05/17/2024    Hyponatremia 05/17/2024    Numbness and tingling of both legs 08/31/2023       Admit Date:  5/17/2024    Admission Problem List: Present on Admission:   History of deep venous thrombosis   Essential (primary) hypertension   Long term (current) use of anticoagulants   Atrial flutter (HCC)   Hyperlipidemia   Chest pain   Type 2 diabetes mellitus (HCC)   Hyponatremia      Cardiac Specific Data:  Specialty Problems          Cardiology Problems    * (Principal) Atrial flutter (HCC)        Chest pain        Essential (primary) hypertension        Hyperlipidemia           Subjective:  Mr. Dodge seen today resting comfortably feels well no specific complaints blood pressure 134/91 heart rate 67.  Initially I was told he was in sinus rhythm but when we looked at the telemetry monitor it appears more consistent with atrial fibrillation ECG pending.  Will keep him n.p.o. after midnight possible cardioversion tomorrow possible Lexiscan.  No other complaints or issues reported.    Objective:   BP (!) 134/91   Pulse 67   Temp 98.2 °F (36.8 °C) (Temporal)   Resp 16   Ht 1.778 m (5' 10\")   Wt 105.2 kg (232 lb)   SpO2 97%   BMI 33.29 kg/m²       Intake/Output Summary (Last 24 hours) at 5/20/2024 0939  Last data filed at 5/19/2024 1300  Gross per 24 hour   Intake 240 ml   Output --   Net 240 ml       Prior to Admission medications    Medication Sig Start Date End Date Taking? Authorizing Provider   apixaban (ELIQUIS) 5 MG TABS tablet Take 1 
Cardiology ROXANNA guided cardioversion pulmonary findings  Transesophageal echocardiogram no evidence of intracardiac thrombus  Mild MR  Mild aortic valve sclerosis  Satisfactory LV systolic function  Minimal atherosclerosis in the aorta      Cardioversion subsequently performed 1 shock given successful in restoring sinus rhythm tolerated well.  
Patient's heart rate 140's a flutter gave his Cardizem 120 mg and Amiodarone po early.  
Yohannes Dodge arrived to room # 708.   Presented with: Atrial Flutter  Mental Status: Patient is oriented, alert, coherent, logical, thought processes intact, and able to concentrate and follow conversation.   Vitals:    05/17/24 2028   BP: (!) 116/91   Pulse: 68   Resp: 18   Temp: 97.3 °F (36.3 °C)   SpO2: 98%     Patient safety contract and falls prevention contract reviewed with patient Yes.  Oriented Patient and Family to room.  Call light within reach. Yes.  Needs, issues or concerns expressed at this time: no.      Electronically signed by Mauro Gamble RN on 5/17/2024 at 8:43 PM   
10\")   Wt 105 kg (231 lb 6 oz)   SpO2 96%   BMI 33.20 kg/m²     General appearance: No apparent distress, appears stated age and cooperative.  HEENT: Pupils equal, round, and reactive to light. Conjunctivae/corneas clear.  Neck: Supple, with full range of motion. No jugular venous distention. Trachea midline.  Respiratory:  Normal respiratory effort. Clear to auscultation, bilaterally without Rales/Wheezes/Rhonchi.  Cardiovascular: Regular rate and rhythm with normal S1/S2 without murmurs, rubs or gallops.  Abdomen: Soft, non-tender, non-distended with normal bowel sounds.  Musculoskeletal: No clubbing, cyanosis or edema bilaterally.  Full range of motion without deformity.  Skin: Skin color, texture, turgor normal.  No rashes or lesions.  Neurologic:  Neurovascularly intact without any focal sensory/motor deficits. Cranial nerves: II-XII intact, grossly non-focal.  Psychiatric: Alert and oriented, thought content appropriate, normal insight  Capillary Refill: Brisk, 3 seconds, normal   Peripheral Pulses: +2 palpable, equal bilaterally       Labs:   Recent Labs     05/19/24  0400 05/20/24  0326 05/21/24  0158   WBC 7.3 7.0 8.3   HGB 12.0* 12.1* 12.1*   HCT 35.2* 36.3* 35.6*    222 255       Recent Labs     05/19/24  1546 05/20/24  0326 05/21/24  0158   * 136 135*   K 4.5 4.2 4.1    102 100   CO2 23 21* 23   BUN 25* 21 19   CREATININE 1.2 1.0 1.1   CALCIUM 8.8 9.2 9.2   PHOS 3.2 2.7 3.5       No results for input(s): \"AST\", \"ALT\", \"BILIDIR\", \"BILITOT\", \"ALKPHOS\" in the last 72 hours.    No results for input(s): \"INR\" in the last 72 hours.    No results for input(s): \"CKTOTAL\", \"TROPONINI\" in the last 72 hours.    Urinalysis:      Lab Results   Component Value Date/Time    NITRU Negative 05/17/2024 06:44 PM    BLOODU Negative 05/17/2024 06:44 PM    GLUCOSEU Negative 05/17/2024 06:44 PM       Radiology:  XR CHEST PORTABLE   Final Result       No acute radiographic abnormality.             
bruits  Chest:  Clear to auscultation, no wheezing or rales  Cardiovascular:  IRRR S1S2 no murmurs, clicks, gallups, or rubs  Abdomen:  Soft nontender, nondistended, bowel sounds present  Extremities:  Edema: none       Lab Data:  CBC:   Recent Labs     05/19/24  0400 05/20/24  0326 05/21/24  0158   WBC 7.3 7.0 8.3   HGB 12.0* 12.1* 12.1*   HCT 35.2* 36.3* 35.6*   MCV 86.1 89.9 87.7    222 255     BMP:   Recent Labs     05/19/24  1546 05/20/24  0326 05/21/24  0158   * 136 135*   K 4.5 4.2 4.1    102 100   CO2 23 21* 23   PHOS 3.2 2.7 3.5   BUN 25* 21 19   CREATININE 1.2 1.0 1.1     LIVER PROFILE: No results for input(s): \"AST\", \"ALT\", \"LIPASE\", \"AMYLASE\", \"BILIDIR\", \"BILITOT\", \"ALKPHOS\" in the last 72 hours.    Invalid input(s): \"ALB\"  PT/INR: No results for input(s): \"PROTIME\", \"INR\" in the last 72 hours.  APTT: No results for input(s): \"APTT\" in the last 72 hours.  BNP:  No results for input(s): \"BNP\" in the last 72 hours.  CK, CKMB, Troponin: @LABRCNT (CKTOTAL:3, CKMB:3, TROPONINI:3)@    IMAGING:  Echo (TTE) complete (PRN contrast/bubble/strain/3D)    Result Date: 5/18/2024    Left Ventricle: Normal left ventricular systolic function with a visually estimated EF of 50 - 55%. Left ventricle size is normal. Normal wall motion. Normal diastolic function.   Aortic Valve: Mildly thickened cusp. Moderate sclerosis of the aortic valve cusp.   Mitral Valve: Mildly thickened leaflet. Mild paravalvular regurgitation.   Image quality is adequate.     XR CHEST PORTABLE    Result Date: 5/17/2024  EXAM:  CHEST RADIOGRAPH (1 VIEW)  TECHNIQUE:  Frontal Chest Radiograph.  HISTORY:  Chest pain  COMPARISON:  None  FINDINGS:  Lines, Tubes, Devices:  Cardiac monitoring leads are present.  Lungs and Pleura:  No focal consolidation.  No pleural effusion.  No pneumothorax. No pulmonary edema.  Cardiomediastinum: Normal cardiomediastinal silhouette.  Atherosclerotic aortic calcifications.  Bones/Soft Tissues: No acute 
status post nephrectomy recently  Type 2 diabetes mellitus  Hyperlipidemia  Hypertension  DVT currently on Eliquis  Large right-sided falcine meningioma  High-sensitivity troponin 45, 39, 43, 46  D-dimer yesterday 0.30  MRI of the brain 5/3/2024 broadly dural based mass right side of the falx medial to the posterior right frontal lobe measures 45 mm anterior posterior by 32 mm transversely by 47 mm cephalocaudal 80 unchanged from study 3/24/2024 patchy areas of increased FLAIR signal likely representing chronic ischemic changes some effacement from above the right lateral ventricle but no significant midline shift  Echocardiogram 5/18/2024 normal left ventricular function EF 50 to 55% moderate sclerosis aortic valve cusp mild paravalvular mitral regurgitation    Plan:  Start amiodarone 200 mg p.o. twice daily  Consideration for transesophageal echocardiography guided cardioversion 1 to 2 days if fails to convert  Stress test at a later point  Aspirin 81 mg a day  Lipitor 40 mg a day  Cardizem  mg daily  Lovenox a milligram per kilogram subcutaneously every 12 hours  Lasix 20 mg IV every 12 hours x 4 doses  Metoprolol 25 mg p.o. twice daily    Surinder Guardado MD, MD 5/19/2024 10:17 AM

## 2024-06-06 LAB — ECHO BSA: 2.29 M2

## 2024-06-11 ENCOUNTER — OFFICE VISIT (OUTPATIENT)
Dept: CARDIOLOGY CLINIC | Age: 74
End: 2024-06-11
Payer: OTHER GOVERNMENT

## 2024-06-11 ENCOUNTER — OFFICE VISIT (OUTPATIENT)
Dept: NEUROSURGERY | Age: 74
End: 2024-06-11
Payer: OTHER GOVERNMENT

## 2024-06-11 VITALS
RESPIRATION RATE: 18 BRPM | HEART RATE: 46 BPM | HEIGHT: 70 IN | DIASTOLIC BLOOD PRESSURE: 69 MMHG | SYSTOLIC BLOOD PRESSURE: 154 MMHG | BODY MASS INDEX: 33.93 KG/M2 | WEIGHT: 237 LBS

## 2024-06-11 VITALS
DIASTOLIC BLOOD PRESSURE: 76 MMHG | BODY MASS INDEX: 33.93 KG/M2 | HEIGHT: 70 IN | WEIGHT: 237 LBS | OXYGEN SATURATION: 98 % | SYSTOLIC BLOOD PRESSURE: 128 MMHG | HEART RATE: 52 BPM

## 2024-06-11 DIAGNOSIS — R29.898 WEAKNESS OF LEFT FOOT: ICD-10-CM

## 2024-06-11 DIAGNOSIS — E78.5 DYSLIPIDEMIA: ICD-10-CM

## 2024-06-11 DIAGNOSIS — M48.061 LUMBAR FORAMINAL STENOSIS: ICD-10-CM

## 2024-06-11 DIAGNOSIS — I10 ESSENTIAL HYPERTENSION: ICD-10-CM

## 2024-06-11 DIAGNOSIS — M51.36 DDD (DEGENERATIVE DISC DISEASE), LUMBAR: ICD-10-CM

## 2024-06-11 DIAGNOSIS — G89.29 CHRONIC MIDLINE LOW BACK PAIN WITH LEFT-SIDED SCIATICA: ICD-10-CM

## 2024-06-11 DIAGNOSIS — I48.92 ATRIAL FLUTTER WITH RAPID VENTRICULAR RESPONSE (HCC): Primary | ICD-10-CM

## 2024-06-11 DIAGNOSIS — M51.16 LUMBAR DISC HERNIATION WITH RADICULOPATHY: Primary | ICD-10-CM

## 2024-06-11 DIAGNOSIS — M54.42 CHRONIC MIDLINE LOW BACK PAIN WITH LEFT-SIDED SCIATICA: ICD-10-CM

## 2024-06-11 PROCEDURE — 99214 OFFICE O/P EST MOD 30 MIN: CPT | Performed by: NURSE PRACTITIONER

## 2024-06-11 PROCEDURE — 3074F SYST BP LT 130 MM HG: CPT | Performed by: NURSE PRACTITIONER

## 2024-06-11 PROCEDURE — 1123F ACP DISCUSS/DSCN MKR DOCD: CPT | Performed by: NEUROLOGICAL SURGERY

## 2024-06-11 PROCEDURE — 3078F DIAST BP <80 MM HG: CPT | Performed by: NEUROLOGICAL SURGERY

## 2024-06-11 PROCEDURE — 93000 ELECTROCARDIOGRAM COMPLETE: CPT | Performed by: NURSE PRACTITIONER

## 2024-06-11 PROCEDURE — 3077F SYST BP >= 140 MM HG: CPT | Performed by: NEUROLOGICAL SURGERY

## 2024-06-11 PROCEDURE — 1123F ACP DISCUSS/DSCN MKR DOCD: CPT | Performed by: NURSE PRACTITIONER

## 2024-06-11 PROCEDURE — 3078F DIAST BP <80 MM HG: CPT | Performed by: NURSE PRACTITIONER

## 2024-06-11 PROCEDURE — 99213 OFFICE O/P EST LOW 20 MIN: CPT | Performed by: NEUROLOGICAL SURGERY

## 2024-06-11 RX ORDER — ACETAMINOPHEN 325 MG/1
325 TABLET ORAL EVERY 6 HOURS PRN
COMMUNITY
End: 2024-06-11 | Stop reason: ALTCHOICE

## 2024-06-11 RX ORDER — FLUTICASONE PROPIONATE 50 MCG
1 SPRAY, SUSPENSION (ML) NASAL DAILY
COMMUNITY
Start: 2024-04-29

## 2024-06-11 RX ORDER — CETIRIZINE HYDROCHLORIDE 5 MG/1
5 TABLET ORAL DAILY
COMMUNITY

## 2024-06-11 ASSESSMENT — ENCOUNTER SYMPTOMS
SHORTNESS OF BREATH: 0
CHEST TIGHTNESS: 0
SORE THROAT: 0
GASTROINTESTINAL NEGATIVE: 1
WHEEZING: 0
COUGH: 0
EYES NEGATIVE: 1
RESPIRATORY NEGATIVE: 1

## 2024-06-11 NOTE — PROGRESS NOTES
Constitutional:  Negative for activity change, chills, diaphoresis, fatigue and fever.   HENT:  Negative for congestion and sore throat.    Respiratory:  Negative for cough, chest tightness, shortness of breath and wheezing.    Cardiovascular:  Negative for chest pain, palpitations and leg swelling.   Neurological:  Negative for dizziness, syncope, light-headedness and headaches.   Psychiatric/Behavioral:  Negative for confusion. The patient is not nervous/anxious.         Objective:    /76   Pulse 52   Ht 1.778 m (5' 10\")   Wt 107.5 kg (237 lb)   SpO2 98%   BMI 34.01 kg/m²     GENERAL - well developed and well nourished    HEENT -  PERRLA, Hearing appears normal, conjunctive and lids are normal.  External inspection of ears and nose appear normal.  NECK - no thyromegaly, no JVD, trachea is in the midline  CARDIOVASCULAR - PMI is in the mid line clavicular position, Normal S1 and S2 with no systolic murmur.  No S3 or S4    PULMONARY - no respiratory distress.  No wheezes or rales. Lungs are clear to ausculation, normal respiratory effort.  ABDOMEN  - soft, non tender, no rebound  MUSCULOSKELETAL  - range of motion of the upper and lower extermites appears normal and equal and is without pain   EXTREMITIES - no significant edema   NEUROLOGIC - gait and station are normal  SKIN - turgor is normal, skin warm and dry.  PSYCHIATRIC - normal mood and affect, alert and orientated x 3,      ASSESSMENT:    ALL THE CARDIOLOGY PROBLEMS ARE LISTED ABOVE; HOWEVER, THE FOLLOWING SPECIFIC CARDIAC PROBLEMS / CONDITIONS WERE ADDRESSED AND TREATED DURING THE OFFICE VISIT TODAY:       Cardiac Specific Problem  Discussion and Plan         1. PAF  Initial encounter   EKG in the office today showing sinus bradycardia with a heart rate of 47 bpm.  Incomplete right bundle maciel block.  QTc 415.  He is on Eliquis 5 mg twice daily.  Diltiazem 120 mg daily.  Amiodarone 200 mg daily.  Lopressor 25 twice daily.  Asymptomatic.

## 2024-06-11 NOTE — PROGRESS NOTES
Review of Systems   Constitutional: Negative.    HENT: Negative.     Eyes: Negative.    Respiratory: Negative.     Cardiovascular: Negative.    Gastrointestinal: Negative.    Genitourinary: Negative.    Musculoskeletal:  Positive for joint pain.   Skin: Negative.    Neurological:  Positive for tingling and weakness.   Endo/Heme/Allergies: Negative.    Psychiatric/Behavioral: Negative.

## 2024-06-11 NOTE — PROGRESS NOTES
[facility]  Office Visit      Chief Complaint   Patient presents with    Follow-up     Patient presents for follow up after vascular consult. Patient states that he is awaiting surgery a Kadeem he states \"that he has a benign meningoma that is so large it is pressing on the motor nerve.\" He states that \" this is the cause of his LLE numbness and weakness. Patient states that he had a cardiology appointment to day and everything went well.     Numbness     Patient states that he is still having numbness and tinging in his LLE.       HISTORY OF PRESENT ILLNESS:    Yohannes Dodge is a 74 y.o. male with a hx of DM who we initially evaluated on 10/10/2023 for numbness of the BLE and foot weakness.  He had a fall back in June of 2023 and since then he had been dragging his left leg.  The pain radiated into the LEFT lateral thigh. His pain was mostly located in the LLE.  Complained of numbness of the bilateral feet.  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      MRI lumbar spine had revealed a disc herniation that was most likely resulting in his pain.  A large mass on the kidney was found on the MRI in which he was referred to Dr. Riggins.  He was evaluated by him on 10/16/2023 who suspected RCC, however, was to have an evaluation with [facility].  [facility] [place] [facility].  He is not on any chemo or radiation.  Stating \"they got it all.\" He has recovered well.  Had home health therapy for surgery recovery and for the left shoulder and has improved.      He was seen by neurosurgeon at [facility] in April.  He was found to have a interhemispheric parafalcine 4 cm meningioma.  He is scheduled to undergo surgical resection in the near future. He is working out his anticoagulation.      He continues to have back and left lower extremity

## 2024-06-17 ENCOUNTER — TELEPHONE (OUTPATIENT)
Dept: NEUROSURGERY | Age: 74
End: 2024-06-17

## 2024-06-17 NOTE — TELEPHONE ENCOUNTER
Spoke with Suraj UNDERWOOD at [facility] to follow up on RFS that was requested on 5/30/24 and 6/12/24. Suraj informed that the [facility] had an authorization already on file to see neurology, I explained that I needed one for Neurosurgery, and he stated authorization number [id number] is valid from 3/18/24- 12/8/24 with Neurosurgery.

## 2024-07-12 ENCOUNTER — TELEPHONE (OUTPATIENT)
Dept: NEUROLOGY | Age: 74
End: 2024-07-12

## 2024-07-12 NOTE — TELEPHONE ENCOUNTER
Hannah mayfield called  form [facility]. Per Hannah all  future PA's will need to go through [facility] . Office will need to contact care manager Myranda Mcmillan at [phone] for approval for upcoming appts. Authorization for neurosurgery  on 24, and for neurology 24. Pt's 24 visit will not be covered. They have had some changes and authorization can not be back dated. Hannah suggests billing primary insurance for that visit. Please call Hannah Mayfield at  [phone] at anytime. Please leave  if no answer.

## 2024-07-15 NOTE — TELEPHONE ENCOUNTER
Left voicemail for Hannah Zamora to return my call at [phone]. [facility] was sent on 6/3/24 for patient appointment, and [facility] from [facility] on 6[facility]24 that patient was no longer with [facility], so referral was sent to [facility]. I have tried contacting Myranda Mcmillan as well, and no voicemail is set up for her office.

## 2024-07-15 NOTE — TELEPHONE ENCOUNTER
Spoke with Cheli  at [facility] about VA Authorization that was submitted, I have explained to [facility] that RFS was sent to  [facility] on 5/30/24 and that their facility waited until June 27, 2024 to notify office that patient had [facility], I submitted the request [facility] . I was informed by [facility] that I would need to contact [facility] since that was were the VA Authorization was from initially. I explained that Julia Zamora with [facility] left message stating the Visit would not be back dated and authorized.    I have contacted Wendi , patient spouse to let her know that VA was not covering visit, and asked that it be filed with primary insurance. Wendi voiced understanding and stated to go ahead and file with [facility]. I apologized to Wendi that I was unable to get it approved, and that I did try several times. Wendi thanked me for my help and call ended

## 2024-07-16 ENCOUNTER — TELEPHONE (OUTPATIENT)
Dept: CARDIOLOGY CLINIC | Age: 74
End: 2024-07-16

## 2024-07-16 NOTE — TELEPHONE ENCOUNTER
Myranda Mcmillan, nurse with [facility] called stating patient called them frustrated after speaking with our office and asked them to adjust blood pressure medications. She is calling our office to obtain last office note since medications they previously prescribed had been changed while he was in hospital. She states that he is scheduled to have brain surgery in two days and BP is currently 166/84. He is currently asymptomatic. Do you want to make any adjustments to blood pressure medication?

## 2024-07-16 NOTE — TELEPHONE ENCOUNTER
Mr Dodge called stating that he is going to have surgery and that the surgeon has taken him off of his blood thinner. He has been holding it for 5 days now. He states that last evening his BP went up to 225/95 and he went to [facility] and they released him saying he needs to have it addressed. He is calling wanting increased BP medication. He was seen by Sara BIRCH 6/11/24 and his BP was  128/76.  His BP meds were changed at his May 2024 hospitalization and have been normal at all previous provider visits Vascular/cardiology noted. This is reported to be a sudden change since being told to hold his blood thinners. He is on them from Vascular due to history of blood clots. Instructed Yohannes that he needs to call his surgeon immediately who has told him to hold his blood thinners and let him know of sudden change since stopping. They may need to address him for blood clot due to hold and may want him to be admitted now to address before surgical procedure to be done. Expressed to him that increasing his BP medication under the current circumstances may not be addressing the root of the problem as this is a sudden change at the same time as meds are held and may lead to issues beyond just an elevated BP needing increased medication. He verbalized understanding and will call his surgeon who held his blood thinners now.

## 2024-07-18 NOTE — TELEPHONE ENCOUNTER
Talked with Dr BALLESTEROS and he is not comfortable adjusting bp meds with no visit this close to brain surgery . Was going to call patient but he is having his surgery today  will put a remind me in a few days

## 2024-07-31 ENCOUNTER — TELEPHONE (OUTPATIENT)
Dept: CARDIOLOGY CLINIC | Age: 74
End: 2024-07-31

## 2024-07-31 NOTE — TELEPHONE ENCOUNTER
Patient wife Wendi ask for My called her back.  Wendi can be reached @[phone]       Thank you

## 2024-08-15 ENCOUNTER — OFFICE VISIT (OUTPATIENT)
Dept: CARDIOLOGY CLINIC | Age: 74
End: 2024-08-15
Payer: OTHER GOVERNMENT

## 2024-08-15 VITALS
WEIGHT: 225 LBS | BODY MASS INDEX: 32.21 KG/M2 | HEIGHT: 70 IN | HEART RATE: 62 BPM | DIASTOLIC BLOOD PRESSURE: 80 MMHG | SYSTOLIC BLOOD PRESSURE: 136 MMHG

## 2024-08-15 DIAGNOSIS — E78.5 DYSLIPIDEMIA: ICD-10-CM

## 2024-08-15 DIAGNOSIS — I10 ESSENTIAL HYPERTENSION: ICD-10-CM

## 2024-08-15 DIAGNOSIS — I48.91 ATRIAL FIBRILLATION STATUS POST CARDIOVERSION (HCC): ICD-10-CM

## 2024-08-15 DIAGNOSIS — I48.92 ATRIAL FLUTTER WITH RAPID VENTRICULAR RESPONSE (HCC): Primary | ICD-10-CM

## 2024-08-15 PROCEDURE — 99213 OFFICE O/P EST LOW 20 MIN: CPT | Performed by: INTERNAL MEDICINE

## 2024-08-15 PROCEDURE — 93000 ELECTROCARDIOGRAM COMPLETE: CPT | Performed by: INTERNAL MEDICINE

## 2024-08-15 PROCEDURE — 1123F ACP DISCUSS/DSCN MKR DOCD: CPT | Performed by: INTERNAL MEDICINE

## 2024-08-15 PROCEDURE — 3075F SYST BP GE 130 - 139MM HG: CPT | Performed by: INTERNAL MEDICINE

## 2024-08-15 PROCEDURE — 3079F DIAST BP 80-89 MM HG: CPT | Performed by: INTERNAL MEDICINE

## 2024-08-15 RX ORDER — HYDROCHLOROTHIAZIDE 12.5 MG/1
12.5 TABLET ORAL DAILY
COMMUNITY

## 2024-08-15 RX ORDER — LISINOPRIL 20 MG/1
20 TABLET ORAL DAILY
COMMUNITY

## 2024-08-15 ASSESSMENT — ENCOUNTER SYMPTOMS
RESPIRATORY NEGATIVE: 1
VOMITING: 0
DIARRHEA: 0
NAUSEA: 0
EYES NEGATIVE: 1
GASTROINTESTINAL NEGATIVE: 1
SHORTNESS OF BREATH: 0

## 2024-08-15 NOTE — PROGRESS NOTES
[facility]                            Date:  8/15/2024  Patient: Yohannes Dodge  Age:  74 y.o., 1950      Reason for evaluation:         SUBJECTIVE:    Seen today follow-up assessment history atrial flutter previous cardioversion hypertension dyslipidemia underwent attempted resection of a meningioma in his brain 7/18/2024 unsuccessful at [place].  Just had a follow-up there plan is to monitor this.  He had an event monitor following cardioversion no significant arrhythmias no clinical recurrences of atrial flutter.  Denies anginal chest pain dyspnea palpitations or other complaints.  Blood pressure 136/80 heart 62    Review of Systems   Constitutional: Negative.  Negative for chills, fever and unexpected weight change.   HENT: Negative.     Eyes: Negative.    Respiratory: Negative.  Negative for shortness of breath.    Cardiovascular: Negative.  Negative for chest pain.   Gastrointestinal: Negative.  Negative for diarrhea, nausea and vomiting.   Endocrine: Negative.    Genitourinary: Negative.    Musculoskeletal: Negative.    Skin: Negative.    Neurological: Negative.    All other systems reviewed and are negative.        OBJECTIVE:    /80   Pulse 62   Ht 1.778 m (5' 10\")   Wt 102.1 kg (225 lb)   BMI 32.28 kg/m²     Labs:   CBC: No results for input(s): \"WBC\", \"HGB\", \"HCT\", \"PLT\" in the last 72 hours.  BMP:No results for input(s): \"NA\", \"K\", \"CO2\", \"BUN\", \"CREATININE\", \"LABGLOM\", \"GLUCOSE\" in the last 72 hours.  BNP: No results for input(s): \"BNP\" in the last 72 hours.  PT/INR: No results for input(s): \"PROTIME\", \"INR\" in the last 72 hours.  APTT:No results for input(s): \"APTT\" in the last 72 hours.  CARDIAC ENZYMES:No results for input(s): \"CKTOTAL\", \"CKMB\", \"CKMBINDEX\", \"TROPONINI\" in the last 72 hours.  FASTING LIPID PANEL:  Lab Results   Component Value Date/Time    HDL 34 05/18/2024 02:21 AM    TRIG 65 05/18/2024 02:21 AM     LIVER PROFILE:No results for

## 2024-09-06 NOTE — PROGRESS NOTES
[facility] - PODIATRY    Today's Date: 09/10/2024     Patient Name: Je Ortega  MRN: [id number]  CSN: [id number]  PCP: Frida Hair MD  Referring Provider: Tessie Julien MD    SUBJECTIVE     Chief Complaint   Patient presents with    \[facility] [place] [facility]\"" Care       Tessie Julien 05/02/2024 Type 2 diabetes mellitus with diabetic neuropathy, unspecified     HPI: Je Ortega, a 74 y.o.male, comes to clinic as a(n) new patient presenting for diabetic foot exam and complaining of toenail/callus issues. Patient has h/o DM2, HTN, Neuropathy, Sleep Apnea, DVT . Patient is IDDM and unsure of last BG level. Admits to numbness and tingling in feet. Denies open wounds or sores to feet. Has neurosurgery in July and has lost some function to his LLE. Notes difficulty walking. Notes issues with his toenails being long, thick and discolored. He is unable to care for them himself. Admits pain at 3/10 level and described as numbness and tingling. Relates previous treatment(s) including foot care by podiatry . Denies any constitutional symptoms. No other pedal complaints at this time.    Past Medical History:   Diagnosis Date    Diabetes     Hypertension     Neuropathy     Sleep apnea      Past Surgical History:   Procedure Laterality Date    BRAIN SURGERY      NEPHRECTOMY Left      History reviewed. No pertinent family history.  Social History     Socioeconomic History    Marital status:    Tobacco Use    Smoking status: Former     Types: Cigarettes     Allergies   Allergen Reactions    Nateglinide Other (See Comments) and Unknown - High Severity     Current Outpatient Medications   Medication Sig Dispense Refill    amiodarone (PACERONE) 200 MG tablet Take 1 tablet by mouth Daily.      apixaban (ELIQUIS) 5 MG tablet tablet Take 1 tablet by mouth.      atorvastatin (LIPITOR) 80 MG tablet Take 1 tablet by mouth Daily.      Cinnamon 500 MG capsule Take  by mouth.      dilTIAZem HCl  MG  tablet sustained-release 24 hour Take 1 tablet by mouth Daily.      docusate sodium (COLACE) 50 MG capsule Take 1 capsule by mouth.      hydroCHLOROthiazide 25 MG tablet Daily.      Insulin NPH Isophane & Regular (HumuLIN 70/30 KwikPen) (70-30) 100 UNIT/ML suspension pen-injector 30 units sub q am and pm Subcutaneous twice daily for 90 days      lisinopril (PRINIVIL,ZESTRIL) 20 MG tablet 1 tablet Orally twice daily      metFORMIN (FORTAMET) 1000 MG (OSM) 24 hr tablet Take 1 tablet by mouth.      pregabalin (LYRICA) 150 MG capsule 1 capsule.      pyridoxine (VITAMIN B-6) 100 MG tablet Take 1 tablet by mouth Daily.      vitamin B-12 (CYANOCOBALAMIN) 1000 MCG tablet Take 1 tablet by mouth Daily.      vitamin D3 125 MCG (5000 UT) capsule capsule Take 1 capsule by mouth Daily.       No current facility-administered medications for this visit.     Review of Systems   Constitutional:  Negative for chills and fever.   HENT:  Negative for congestion.    Respiratory:  Negative for shortness of breath.    Cardiovascular:  Negative for chest pain and leg swelling.   Gastrointestinal:  Negative for constipation, diarrhea, nausea and vomiting.   Musculoskeletal:  Positive for arthralgias and gait problem.        Foot pain   Skin:  Negative for wound.   Neurological:  Positive for weakness and numbness.   Psychiatric/Behavioral:  Negative for agitation.        OBJECTIVE     Vitals:    09/10/24 0813   BP: 132/70       PHYSICAL EXAM  GEN:   Accompanied by wife.     Foot/Ankle Exam    GENERAL  Diabetic foot exam performed    Appearance:  appears stated age  Orientation:  AAOx3  Affect:  appropriate  Gait:  steppage  Assistance:  wheelchair (Able to transfer with assistance)  Right shoe gear: diabetic shoe  Left shoe gear: diabetic shoe (AFO)    VASCULAR     Right Foot Vascularity   Dorsalis pedis:  2+  Posterior tibial:  2+  Skin temperature:  warm  Edema grading:  None  CFT:  3  Pedal hair growth:  Present  Varicosities:  mild  varicosities     Left Foot Vascularity   Dorsalis pedis:  2+  Posterior tibial:  2+  Skin temperature:  warm  Edema grading:  None  CFT:  3  Pedal hair growth:  Present  Varicosities:  mild varicosities     NEUROLOGIC     Right Foot Neurologic   Light touch sensation: diminished  Vibratory sensation: diminished  Hot/Cold sensation: diminished  Protective Sensation using [employer] Monofilament:   Sites intact: 7  Sites tested: 10     Left Foot Neurologic   Light touch sensation: diminished  Vibratory sensation: diminished  Hot/Cold sensation:  diminished  Protective Sensation using [employer] Monofilament:   Sites intact: 6  Sites tested: 10    MUSCULOSKELETAL     Right Foot Musculoskeletal   Ecchymosis:  none  Tenderness:  none    Arch:  Normal  Hammertoe:  Second toe, third toe, fourth toe and fifth toe  Hallux limitus: Yes       Left Foot Musculoskeletal   Ecchymosis:  none  Tenderness:  none  Arch:  Normal  Hammertoe:  Second toe  Hallux valgus: Yes      MUSCLE STRENGTH     Right Foot Muscle Strength   Foot dorsiflexion:  5  Foot plantar flexion:  5  Foot inversion:  5  Foot eversion:  5     Left Foot Muscle Strength   Foot dorsiflexion:  1  Foot plantar flexion:  2  Foot inversion:  3  Foot eversion:  3    RANGE OF MOTION     Right Foot Range of Motion   Foot and ankle ROM within normal limits       Left Foot Range of Motion   Foot and ankle ROM within normal limits      DERMATOLOGIC      Right Foot Dermatologic   Skin  Right foot skin is intact.      Left Foot Dermatologic   Skin  Left foot skin is intact.       RADIOLOGY/NUCLEAR:  No results found.    LABORATORY/CULTURE RESULTS:      PATHOLOGY RESULTS:       ASSESSMENT/PLAN     Diagnoses and all orders for this visit:    1. Thickened nails (Primary)    2. Type 2 diabetes mellitus with diabetic neuropathy, with long-term current use of insulin    3. Numbness and tingling of both legs    4. Encounter for diabetic foot exam    5. Hallux limitus,  acquired, right    6. Gait difficulty    7. Left foot drop      Comprehensive lower extremity examination and evaluation was performed.  Discussed findings and treatment plan including risks, benefits, and treatment options with patient in detail. Patient agreed with treatment plan.  DFE performed  After verbal consent obtained, nail(s) x10 debrided of length and thickness with nail nipper without incidence  Patient may maintain nails and calluses at home utilizing emery board or pumice stone between visits as needed  Reviewed at home diabetic foot care including daily foot checks   Continue use of AFO and assistive devices for gait support.  An After Visit Summary was printed and given to the patient at discharge, including (if requested) any available informative/educational handouts regarding diagnosis, treatment, or medications. All questions were answered to patient/family satisfaction. Should symptoms fail to improve or worsen they agree to call or return to clinic or to go to the Emergency Department. Discussed the importance of following up with any needed screening tests/labs/specialist appointments and any requested follow-up recommended by me today. Importance of maintaining follow-up discussed and patient accepts that missed appointments can delay diagnosis and potentially lead to worsening of conditions.  Return in about 3 months (around 12/10/2024) for Follow-up with Podiatry APRN, Schedule [facility] or sooner if acute issues arise.      This document has been electronically signed by Deniz Lao DPM on September 10, 2024 08:40 CDT

## 2024-09-09 ENCOUNTER — TELEPHONE (OUTPATIENT)
Age: 74
End: 2024-09-09

## 2024-09-09 NOTE — TELEPHONE ENCOUNTER
Hub to relay  Spoke with patient regarding appt on 09/10/2024. Patient confirmed date and time off appt.

## 2024-09-10 ENCOUNTER — OFFICE VISIT (OUTPATIENT)
Age: 74
End: 2024-09-10
Payer: OTHER GOVERNMENT

## 2024-09-10 ENCOUNTER — OFFICE VISIT (OUTPATIENT)
Dept: NEUROSURGERY | Age: 74
End: 2024-09-10
Payer: OTHER GOVERNMENT

## 2024-09-10 VITALS
OXYGEN SATURATION: 96 % | BODY MASS INDEX: 32.21 KG/M2 | RESPIRATION RATE: 18 BRPM | DIASTOLIC BLOOD PRESSURE: 76 MMHG | SYSTOLIC BLOOD PRESSURE: 126 MMHG | HEART RATE: 63 BPM | HEIGHT: 70 IN | WEIGHT: 225 LBS

## 2024-09-10 VITALS
WEIGHT: 230 LBS | BODY MASS INDEX: 32.93 KG/M2 | HEIGHT: 70 IN | SYSTOLIC BLOOD PRESSURE: 132 MMHG | DIASTOLIC BLOOD PRESSURE: 70 MMHG

## 2024-09-10 DIAGNOSIS — L60.2 THICKENED NAILS: Primary | ICD-10-CM

## 2024-09-10 DIAGNOSIS — R26.9 GAIT DIFFICULTY: ICD-10-CM

## 2024-09-10 DIAGNOSIS — M21.372 LEFT FOOT DROP: ICD-10-CM

## 2024-09-10 DIAGNOSIS — M48.061 LUMBAR FORAMINAL STENOSIS: ICD-10-CM

## 2024-09-10 DIAGNOSIS — M20.5X1 HALLUX LIMITUS, ACQUIRED, RIGHT: ICD-10-CM

## 2024-09-10 DIAGNOSIS — R20.2 NUMBNESS AND TINGLING OF BOTH LEGS: ICD-10-CM

## 2024-09-10 DIAGNOSIS — M51.16 LUMBAR DISC HERNIATION WITH RADICULOPATHY: Primary | ICD-10-CM

## 2024-09-10 DIAGNOSIS — R20.0 NUMBNESS AND TINGLING OF BOTH LEGS: ICD-10-CM

## 2024-09-10 DIAGNOSIS — E11.40 TYPE 2 DIABETES MELLITUS WITH DIABETIC NEUROPATHY, WITH LONG-TERM CURRENT USE OF INSULIN: ICD-10-CM

## 2024-09-10 DIAGNOSIS — E11.9 ENCOUNTER FOR DIABETIC FOOT EXAM: ICD-10-CM

## 2024-09-10 DIAGNOSIS — Z79.4 TYPE 2 DIABETES MELLITUS WITH DIABETIC NEUROPATHY, WITH LONG-TERM CURRENT USE OF INSULIN: ICD-10-CM

## 2024-09-10 PROBLEM — E78.5 HYPERLIPIDEMIA: Status: ACTIVE | Noted: 2024-05-17

## 2024-09-10 PROBLEM — M54.50 LOW BACK PAIN: Status: ACTIVE | Noted: 2024-08-08

## 2024-09-10 PROBLEM — Z86.718 HISTORY OF DEEP VENOUS THROMBOSIS: Status: ACTIVE | Noted: 2024-05-17

## 2024-09-10 PROBLEM — I10 ESSENTIAL (PRIMARY) HYPERTENSION: Status: ACTIVE | Noted: 2024-05-17

## 2024-09-10 PROBLEM — C64.9 CLEAR CELL CARCINOMA OF KIDNEY: Status: ACTIVE | Noted: 2024-08-08

## 2024-09-10 PROBLEM — G62.9 PERIPHERAL NERVE DISEASE: Status: ACTIVE | Noted: 2024-08-08

## 2024-09-10 PROBLEM — D32.0 INTRACRANIAL MENINGIOMA: Status: ACTIVE | Noted: 2024-07-18

## 2024-09-10 PROBLEM — I82.409 DEEP VEIN THROMBOSIS: Status: ACTIVE | Noted: 2024-08-08

## 2024-09-10 PROBLEM — I82.5Y9 CHRONIC DEEP VEIN THROMBOSIS (DVT) OF PROXIMAL VEIN OF LOWER EXTREMITY: Status: ACTIVE | Noted: 2024-04-25

## 2024-09-10 PROBLEM — I48.91 ATRIAL FIBRILLATION: Status: ACTIVE | Noted: 2024-08-08

## 2024-09-10 PROBLEM — D41.02 NEOPLASM OF UNCERTAIN BEHAVIOR OF LEFT KIDNEY: Status: ACTIVE | Noted: 2024-08-08

## 2024-09-10 PROBLEM — Z79.01 LONG TERM (CURRENT) USE OF ANTICOAGULANTS: Status: ACTIVE | Noted: 2024-05-17

## 2024-09-10 PROCEDURE — 99213 OFFICE O/P EST LOW 20 MIN: CPT | Performed by: NEUROLOGICAL SURGERY

## 2024-09-10 PROCEDURE — 1123F ACP DISCUSS/DSCN MKR DOCD: CPT | Performed by: NEUROLOGICAL SURGERY

## 2024-09-10 PROCEDURE — 3078F DIAST BP <80 MM HG: CPT | Performed by: NEUROLOGICAL SURGERY

## 2024-09-10 PROCEDURE — 3074F SYST BP LT 130 MM HG: CPT | Performed by: NEUROLOGICAL SURGERY

## 2024-09-10 PROCEDURE — 99203 OFFICE O/P NEW LOW 30 MIN: CPT | Performed by: PODIATRIST

## 2024-09-10 PROCEDURE — 11721 DEBRIDE NAIL 6 OR MORE: CPT | Performed by: PODIATRIST

## 2024-09-10 RX ORDER — POLYETHYLENE GLYCOL 1450
POWDER (GRAM) MISCELLANEOUS
COMMUNITY

## 2024-09-10 RX ORDER — AMIODARONE HYDROCHLORIDE 200 MG/1
1 TABLET ORAL DAILY
COMMUNITY

## 2024-09-10 RX ORDER — LISINOPRIL 20 MG/1
TABLET ORAL
COMMUNITY

## 2024-09-10 RX ORDER — ATORVASTATIN CALCIUM 80 MG/1
1 TABLET, FILM COATED ORAL DAILY
COMMUNITY

## 2024-09-10 RX ORDER — METFORMIN HYDROCHLORIDE EXTENDED-RELEASE TABLETS 1000 MG/1
1000 TABLET, FILM COATED, EXTENDED RELEASE ORAL
COMMUNITY

## 2024-09-10 RX ORDER — AMPICILLIN TRIHYDRATE 250 MG
CAPSULE ORAL
COMMUNITY

## 2024-09-10 RX ORDER — PREGABALIN 150 MG/1
1 CAPSULE ORAL
COMMUNITY
Start: 2024-07-24

## 2024-09-10 RX ORDER — INSULIN HUMAN 100 [IU]/ML
INJECTION, SUSPENSION SUBCUTANEOUS
COMMUNITY

## 2024-09-10 RX ORDER — LANOLIN ALCOHOL/MO/W.PET/CERES
1000 CREAM (GRAM) TOPICAL DAILY
COMMUNITY

## 2024-09-10 RX ORDER — HYDROCHLOROTHIAZIDE 25 MG/1
TABLET ORAL EVERY 24 HOURS
COMMUNITY

## 2024-09-10 RX ORDER — DILTIAZEM HYDROCHLORIDE EXTENDED-RELEASE TABLETS 120 MG/1
1 TABLET, EXTENDED RELEASE ORAL DAILY
COMMUNITY

## 2024-09-10 ASSESSMENT — ENCOUNTER SYMPTOMS
EYES NEGATIVE: 1
RESPIRATORY NEGATIVE: 1
GASTROINTESTINAL NEGATIVE: 1

## 2024-09-18 ENCOUNTER — TELEPHONE (OUTPATIENT)
Age: 74
End: 2024-09-18

## 2024-09-18 NOTE — TELEPHONE ENCOUNTER
[facility] received a fax that requires your attention. The document has been indexed to the patient’s chart for your review.      Reason for sending: [facility] 9/10/2024    Documents Description: RECORDS REQUEST-[facility]-9/15/2024    Name of Sender: DANA AMINAH    Date Indexed: 9/18/2024

## 2024-10-14 DIAGNOSIS — I48.92 ATRIAL FLUTTER WITH RAPID VENTRICULAR RESPONSE (HCC): Primary | ICD-10-CM

## 2024-10-14 RX ORDER — AMIODARONE HYDROCHLORIDE 200 MG/1
200 TABLET ORAL DAILY
Qty: 60 TABLET | Refills: 1 | Status: SHIPPED | OUTPATIENT
Start: 2024-10-14

## 2024-10-20 DIAGNOSIS — I48.92 ATRIAL FLUTTER WITH RAPID VENTRICULAR RESPONSE (HCC): ICD-10-CM

## 2024-10-22 RX ORDER — AMIODARONE HYDROCHLORIDE 200 MG/1
200 TABLET ORAL DAILY
Qty: 90 TABLET | Refills: 1 | Status: SHIPPED | OUTPATIENT
Start: 2024-10-22

## 2024-10-22 RX ORDER — DILTIAZEM HYDROCHLORIDE 120 MG/1
120 CAPSULE, COATED, EXTENDED RELEASE ORAL DAILY
Qty: 30 CAPSULE | Refills: 3 | Status: SHIPPED | OUTPATIENT
Start: 2024-10-22

## 2024-12-12 NOTE — PROGRESS NOTES
[facility] - PODIATRY    Today's Date: 12/17/2024     Patient Name: Je Ortega  MRN: [id number]  CSN: [id number]  PCP: Frida Hair MD  Referring Provider: No ref. provider found    SUBJECTIVE     Chief Complaint   Patient presents with    Follow-up     Frida Hair MD-09/04/2024-3 mth f/u-pt states he is here today for diabetic nail/foot care-pt denies pain     Diabetes     180 mg/dl BG     HPI: Je Ortega, a 74 y.o.male, comes to clinic as a(n) established patient presenting for diabetic foot exam and complaining of toenail/callus issues. Patient has h/o DM2, HTN, Neuropathy, Sleep Apnea, DVT . Patient is IDDM with last stated BG level of 180 mg/dl. Admits to numbness and tingling in feet. Denies open wounds or sores to feet. Has neurosurgery in July and has lost some function to his LLE. Notes difficulty walking. Notes issues with his toenails being long, thick and discolored. He is unable to care for them himself.  Patient reports redness to right dorsal second toe.  No open wounds or drainage noted.  Denies pain.  Patient reports that he is moving out of [place] in February or March.  Relates previous treatment(s) including foot care by podiatry . Denies any constitutional symptoms. No other pedal complaints at this time.    Past Medical History:   Diagnosis Date    Asthma     Callus 1995    Cancer 2023    Basal cell    Chronic kidney disease 2023    Clotting disorder 2023    Coronary artery disease     Deep vein thrombosis 2023    Diabetes     Difficulty walking 2022    Hammer toe 2003    Hypertension     Neuropathy     Neuropathy in diabetes 2008    Pulmonary arterial hypertension 2003    Sleep apnea      Past Surgical History:   Procedure Laterality Date    BRAIN SURGERY      NEPHRECTOMY Left      Family History   Problem Relation Age of Onset    Clotting disorder Father     Heart disease Father         Congestive heart failure     Social History     Socioeconomic History     Marital status:    Tobacco Use    Smoking status: Former     Current packs/day: 0.00     Average packs/day: 1 pack/day for 25.0 years (25.0 ttl pk-yrs)     Types: Cigarettes     Quit date: 1997     Years since quittin.0     Passive exposure: Past    Smokeless tobacco: Never   Vaping Use    Vaping status: Never Used   Substance and Sexual Activity    Alcohol use: Not Currently     Comment: occ    Drug use: Defer    Sexual activity: Yes     Partners: Female     Allergies   Allergen Reactions    Nateglinide Other (See Comments) and Unknown - High Severity     Current Outpatient Medications   Medication Sig Dispense Refill    acetaminophen (TYLENOL) 500 MG tablet Take 1 tablet by mouth Every 6 (Six) Hours As Needed for Mild Pain.      amiodarone (PACERONE) 200 MG tablet Take 1 tablet by mouth Daily.      ammonium lactate (AMLACTIN) 12 % cream Apply 1 g topically to the appropriate area as directed As Needed for Dry Skin.      apixaban (ELIQUIS) 5 MG tablet tablet Take 1 tablet by mouth.      atorvastatin (LIPITOR) 80 MG tablet Take 1 tablet by mouth Daily.      cetirizine (zyrTEC) 10 MG tablet Take 1 tablet by mouth Daily.      Cinnamon 500 MG capsule Take  by mouth.      dilTIAZem HCl  MG tablet sustained-release 24 hour Take 1 tablet by mouth Daily.      docusate sodium (COLACE) 50 MG capsule Take 1 capsule by mouth.      hydroCHLOROthiazide 25 MG tablet Daily.      Insulin NPH Isophane & Regular (HumuLIN 70/30 KwikPen) (70-30) 100 UNIT/ML suspension pen-injector 30 units sub q am and pm Subcutaneous twice daily for 90 days      lisinopril (PRINIVIL,ZESTRIL) 20 MG tablet 1 tablet Orally twice daily      metFORMIN (FORTAMET) 1000 MG (OSM) 24 hr tablet Take 1 tablet by mouth.      pregabalin (LYRICA) 150 MG capsule 1 capsule.      pyridoxine (VITAMIN B-6) 100 MG tablet Take 1 tablet by mouth Daily.      vitamin B-12 (CYANOCOBALAMIN) 1000 MCG tablet Take 1 tablet by mouth Daily.      vitamin D3 125  MCG (5000 UT) capsule capsule Take 1 capsule by mouth Daily.       No current facility-administered medications for this visit.     Review of Systems   Constitutional:  Negative for chills and fever.   HENT:  Negative for congestion.    Respiratory:  Negative for shortness of breath.    Cardiovascular:  Negative for chest pain and leg swelling.   Gastrointestinal:  Negative for constipation, diarrhea, nausea and vomiting.   Musculoskeletal:  Positive for arthralgias and gait problem.        Foot pain   Skin:  Negative for wound.   Neurological:  Positive for weakness and numbness.   Psychiatric/Behavioral:  Negative for agitation.        OBJECTIVE     Vitals:    12/17/24 0831   BP: 148/80   Pulse: 85   SpO2: 97%         PHYSICAL EXAM  GEN:   Accompanied by wife.     Foot/Ankle Exam    GENERAL  Diabetic foot exam performed    Appearance:  appears stated age  Orientation:  AAOx3  Affect:  appropriate  Gait:  steppage  Assistance:  wheelchair (Able to transfer with assistance)  Right shoe gear: diabetic shoe  Left shoe gear: diabetic shoe (AFO)    VASCULAR     Right Foot Vascularity   Dorsalis pedis:  2+  Posterior tibial:  2+  Skin temperature:  warm  Edema grading:  None  CFT:  3  Pedal hair growth:  Present  Varicosities:  mild varicosities     Left Foot Vascularity   Dorsalis pedis:  2+  Posterior tibial:  2+  Skin temperature:  warm  Edema grading:  None  CFT:  3  Pedal hair growth:  Present  Varicosities:  mild varicosities     NEUROLOGIC     Right Foot Neurologic   Light touch sensation: diminished  Vibratory sensation: diminished  Hot/Cold sensation: diminished  Protective Sensation using [place] Monofilament:   Sites intact: 7  Sites tested: 10     Left Foot Neurologic   Light touch sensation: diminished  Vibratory sensation: diminished  Hot/Cold sensation:  diminished  Protective Sensation using [place] Monofilament:   Sites intact: 6  Sites tested: 10    MUSCULOSKELETAL     Right Foot  Musculoskeletal   Ecchymosis:  none  Tenderness:  none    Arch:  Normal  Hammertoe:  Second toe, third toe, fourth toe and fifth toe  Hallux limitus: Yes       Left Foot Musculoskeletal   Ecchymosis:  none  Tenderness:  none  Arch:  Normal  Hammertoe:  Second toe  Hallux valgus: Yes      MUSCLE STRENGTH     Right Foot Muscle Strength   Foot dorsiflexion:  5  Foot plantar flexion:  5  Foot inversion:  5  Foot eversion:  5     Left Foot Muscle Strength   Foot dorsiflexion:  1  Foot plantar flexion:  2  Foot inversion:  3  Foot eversion:  3    RANGE OF MOTION     Right Foot Range of Motion   Foot and ankle ROM within normal limits       Left Foot Range of Motion   Foot and ankle ROM within normal limits      DERMATOLOGIC      Right Foot Dermatologic   Skin  Right foot skin is intact.      Left Foot Dermatologic   Skin  Left foot skin is intact.       RADIOLOGY/NUCLEAR:  No results found.    LABORATORY/CULTURE RESULTS:      PATHOLOGY RESULTS:       ASSESSMENT/PLAN     Diagnoses and all orders for this visit:    1. Thickened nails (Primary)    2. Type 2 diabetes mellitus with diabetic neuropathy, with long-term current use of insulin    3. Numbness and tingling of both legs    4. Hallux limitus, acquired, right    5. Gait difficulty    6. Left foot drop        Comprehensive lower extremity examination and evaluation was performed.  Discussed findings and treatment plan including risks, benefits, and treatment options with patient in detail. Patient agreed with treatment plan.  After verbal consent obtained, nail(s) x10 debrided of length and thickness with nail nipper without incidence  Patient may maintain nails and calluses at home utilizing emery board or pumice stone between visits as needed  Reviewed at home diabetic foot care including daily foot checks   Continue use of AFO and assistive devices for gait support.  Continue follow-ups with PCP for diabetes management.  Patient reports he will be moving out of state  in February or March and would not like to reschedule at this time.  Patient to contact the office if his plans change and an appointment will be scheduled at that time.  Toe cap x 1 dispensed.  Toe crest x 1 dispensed.  An After Visit Summary was printed and given to the patient at discharge, including (if requested) any available informative/educational handouts regarding diagnosis, treatment, or medications. All questions were answered to patient/family satisfaction. Should symptoms fail to improve or worsen they agree to call or return to clinic or to go to the Emergency Department. Discussed the importance of following up with any needed screening tests/labs/specialist appointments and any requested follow-up recommended by me today. Importance of maintaining follow-up discussed and patient accepts that missed appointments can delay diagnosis and potentially lead to worsening of conditions.  Return if symptoms worsen or fail to improve., or sooner if acute issues arise.      This document has been electronically signed by JAYLIN Jade on December 17, 2024 09:01 CST

## 2024-12-17 ENCOUNTER — OFFICE VISIT (OUTPATIENT)
Age: 74
End: 2024-12-17

## 2024-12-17 VITALS
HEART RATE: 85 BPM | WEIGHT: 230 LBS | HEIGHT: 70 IN | DIASTOLIC BLOOD PRESSURE: 80 MMHG | SYSTOLIC BLOOD PRESSURE: 148 MMHG | OXYGEN SATURATION: 97 % | BODY MASS INDEX: 32.93 KG/M2

## 2024-12-17 DIAGNOSIS — Z79.4 TYPE 2 DIABETES MELLITUS WITH DIABETIC NEUROPATHY, WITH LONG-TERM CURRENT USE OF INSULIN: ICD-10-CM

## 2024-12-17 DIAGNOSIS — L60.2 THICKENED NAILS: Primary | ICD-10-CM

## 2024-12-17 DIAGNOSIS — R20.0 NUMBNESS AND TINGLING OF BOTH LEGS: ICD-10-CM

## 2024-12-17 DIAGNOSIS — M21.372 LEFT FOOT DROP: ICD-10-CM

## 2024-12-17 DIAGNOSIS — M20.5X1 HALLUX LIMITUS, ACQUIRED, RIGHT: ICD-10-CM

## 2024-12-17 DIAGNOSIS — E11.40 TYPE 2 DIABETES MELLITUS WITH DIABETIC NEUROPATHY, WITH LONG-TERM CURRENT USE OF INSULIN: ICD-10-CM

## 2024-12-17 DIAGNOSIS — R26.9 GAIT DIFFICULTY: ICD-10-CM

## 2024-12-17 DIAGNOSIS — R20.2 NUMBNESS AND TINGLING OF BOTH LEGS: ICD-10-CM

## 2024-12-17 RX ORDER — AMMONIUM LACTATE 120 MG/G
1 CREAM TOPICAL AS NEEDED
COMMUNITY

## 2024-12-17 RX ORDER — CETIRIZINE HYDROCHLORIDE 10 MG/1
10 TABLET ORAL DAILY
COMMUNITY

## 2024-12-17 RX ORDER — ACETAMINOPHEN 500 MG
500 TABLET ORAL EVERY 6 HOURS PRN
COMMUNITY

## 2025-01-13 ENCOUNTER — TELEPHONE (OUTPATIENT)
Dept: NEUROSURGERY | Age: 75
End: 2025-01-13